# Patient Record
Sex: FEMALE | Race: WHITE | Employment: FULL TIME | URBAN - METROPOLITAN AREA
[De-identification: names, ages, dates, MRNs, and addresses within clinical notes are randomized per-mention and may not be internally consistent; named-entity substitution may affect disease eponyms.]

---

## 2017-11-03 PROBLEM — M54.16 LUMBAR RADICULOPATHY: Status: ACTIVE | Noted: 2017-11-03

## 2018-02-28 ENCOUNTER — HOSPITAL ENCOUNTER (OUTPATIENT)
Dept: ULTRASOUND IMAGING | Age: 58
Discharge: HOME | End: 2018-03-02
Payer: COMMERCIAL

## 2018-02-28 ENCOUNTER — HOSPITAL ENCOUNTER (OUTPATIENT)
Dept: GENERAL RADIOLOGY | Age: 58
Discharge: HOME | End: 2018-03-02
Payer: COMMERCIAL

## 2018-02-28 ENCOUNTER — HOSPITAL ENCOUNTER (OUTPATIENT)
Dept: WOMENS IMAGING | Age: 58
Discharge: HOME | End: 2018-03-02
Payer: COMMERCIAL

## 2018-02-28 DIAGNOSIS — M79.602 PAIN IN BOTH UPPER EXTREMITIES: ICD-10-CM

## 2018-02-28 DIAGNOSIS — Z12.39 SCREENING FOR BREAST CANCER: ICD-10-CM

## 2018-02-28 DIAGNOSIS — M79.601 PAIN IN BOTH UPPER EXTREMITIES: ICD-10-CM

## 2018-02-28 DIAGNOSIS — M79.601 PAIN OF RIGHT UPPER EXTREMITY: ICD-10-CM

## 2018-02-28 PROCEDURE — 72050 X-RAY EXAM NECK SPINE 4/5VWS: CPT

## 2018-02-28 PROCEDURE — 77067 SCR MAMMO BI INCL CAD: CPT

## 2018-02-28 PROCEDURE — 93971 EXTREMITY STUDY: CPT

## 2018-03-19 PROBLEM — R20.0 RIGHT ARM NUMBNESS: Status: ACTIVE | Noted: 2018-03-19

## 2018-03-19 PROBLEM — M54.12 CERVICAL RADICULOPATHY: Status: ACTIVE | Noted: 2018-03-19

## 2018-03-19 PROBLEM — M46.92 CERVICAL SPONDYLITIS WITH RADICULITIS (HCC): Status: ACTIVE | Noted: 2018-03-19

## 2018-03-19 PROBLEM — M54.12 CERVICAL SPONDYLITIS WITH RADICULITIS (HCC): Status: ACTIVE | Noted: 2018-03-19

## 2018-03-20 ENCOUNTER — HOSPITAL ENCOUNTER (OUTPATIENT)
Dept: MRI IMAGING | Age: 58
Discharge: HOME | End: 2018-03-22
Payer: COMMERCIAL

## 2018-03-20 DIAGNOSIS — M46.92 CERVICAL SPONDYLITIS WITH RADICULITIS (HCC): ICD-10-CM

## 2018-03-20 DIAGNOSIS — M54.12 CERVICAL SPONDYLITIS WITH RADICULITIS (HCC): ICD-10-CM

## 2018-03-20 DIAGNOSIS — R20.0 RIGHT ARM NUMBNESS: ICD-10-CM

## 2018-03-20 DIAGNOSIS — M54.12 CERVICAL RADICULOPATHY: ICD-10-CM

## 2018-03-20 PROCEDURE — 72141 MRI NECK SPINE W/O DYE: CPT

## 2018-03-22 PROBLEM — M50.20 CERVICAL DISC HERNIATION: Status: ACTIVE | Noted: 2018-03-22

## 2018-03-22 PROBLEM — M48.02 FORAMINAL STENOSIS OF CERVICAL REGION: Status: ACTIVE | Noted: 2018-03-22

## 2018-03-23 ENCOUNTER — HOSPITAL ENCOUNTER (OUTPATIENT)
Dept: PREADMISSION TESTING | Age: 58
Discharge: HOME | End: 2018-03-27
Payer: COMMERCIAL

## 2018-03-23 VITALS
HEIGHT: 64 IN | OXYGEN SATURATION: 96 % | DIASTOLIC BLOOD PRESSURE: 64 MMHG | BODY MASS INDEX: 38 KG/M2 | SYSTOLIC BLOOD PRESSURE: 132 MMHG | WEIGHT: 222.6 LBS | HEART RATE: 72 BPM | RESPIRATION RATE: 16 BRPM | TEMPERATURE: 98.5 F

## 2018-03-23 DIAGNOSIS — G90.A POTS (POSTURAL ORTHOSTATIC TACHYCARDIA SYNDROME): Chronic | ICD-10-CM

## 2018-03-23 PROBLEM — M54.12 CERVICAL SPONDYLITIS WITH RADICULITIS (HCC): Chronic | Status: ACTIVE | Noted: 2018-03-19

## 2018-03-23 PROBLEM — M48.02 FORAMINAL STENOSIS OF CERVICAL REGION: Chronic | Status: ACTIVE | Noted: 2018-03-22

## 2018-03-23 PROBLEM — M46.92 CERVICAL SPONDYLITIS WITH RADICULITIS (HCC): Chronic | Status: ACTIVE | Noted: 2018-03-19

## 2018-03-23 PROBLEM — G47.30 SLEEP APNEA: Status: ACTIVE | Noted: 2018-03-23

## 2018-03-23 PROBLEM — M50.20 CERVICAL DISC HERNIATION: Chronic | Status: ACTIVE | Noted: 2018-03-22

## 2018-03-23 PROBLEM — M54.12 CERVICAL RADICULOPATHY: Chronic | Status: ACTIVE | Noted: 2018-03-19

## 2018-03-23 PROBLEM — G43.909 MIGRAINE: Chronic | Status: ACTIVE | Noted: 2018-03-23

## 2018-03-23 PROBLEM — R20.0 RIGHT ARM NUMBNESS: Chronic | Status: ACTIVE | Noted: 2018-03-19

## 2018-03-23 LAB
ANION GAP SERPL CALCULATED.3IONS-SCNC: 14 MEQ/L (ref 7–13)
APTT: 25.3 SEC (ref 21.6–35.4)
BACTERIA: NORMAL /HPF
BILIRUBIN URINE: NEGATIVE
BLOOD, URINE: NEGATIVE
BUN BLDV-MCNC: 23 MG/DL (ref 6–20)
CALCIUM SERPL-MCNC: 9 MG/DL (ref 8.6–10.2)
CHLORIDE BLD-SCNC: 103 MEQ/L (ref 98–107)
CLARITY: CLEAR
CO2: 23 MEQ/L (ref 22–29)
COLOR: YELLOW
CREAT SERPL-MCNC: 0.96 MG/DL (ref 0.5–0.9)
EKG ATRIAL RATE: 69 BPM
EKG P AXIS: 43 DEGREES
EKG P-R INTERVAL: 156 MS
EKG Q-T INTERVAL: 394 MS
EKG QRS DURATION: 80 MS
EKG QTC CALCULATION (BAZETT): 422 MS
EKG R AXIS: 36 DEGREES
EKG T AXIS: 25 DEGREES
EKG VENTRICULAR RATE: 69 BPM
EPITHELIAL CELLS, UA: NORMAL /HPF
GFR AFRICAN AMERICAN: >60
GFR NON-AFRICAN AMERICAN: 59.5
GLUCOSE BLD-MCNC: 127 MG/DL (ref 74–109)
GLUCOSE URINE: NEGATIVE MG/DL
HCT VFR BLD CALC: 44 % (ref 37–47)
HEMOGLOBIN: 14.8 G/DL (ref 12–16)
INR BLD: 1
KETONES, URINE: NEGATIVE MG/DL
LEUKOCYTE ESTERASE, URINE: ABNORMAL
MCH RBC QN AUTO: 31 PG (ref 27–31.3)
MCHC RBC AUTO-ENTMCNC: 33.6 % (ref 33–37)
MCV RBC AUTO: 92.2 FL (ref 82–100)
NITRITE, URINE: NEGATIVE
PDW BLD-RTO: 13.5 % (ref 11.5–14.5)
PH UA: 7 (ref 5–9)
PLATELET # BLD: 218 K/UL (ref 130–400)
POTASSIUM SERPL-SCNC: 4.8 MEQ/L (ref 3.5–5.1)
PROTEIN UA: NEGATIVE MG/DL
PROTHROMBIN TIME: 10.3 SEC (ref 8.1–13.7)
RBC # BLD: 4.77 M/UL (ref 4.2–5.4)
RBC UA: NORMAL /HPF (ref 0–2)
SODIUM BLD-SCNC: 140 MEQ/L (ref 132–144)
SPECIFIC GRAVITY UA: 1.02 (ref 1–1.03)
UROBILINOGEN, URINE: 0.2 E.U./DL
WBC # BLD: 10.2 K/UL (ref 4.8–10.8)
WBC UA: NORMAL /HPF (ref 0–5)

## 2018-03-23 PROCEDURE — 85730 THROMBOPLASTIN TIME PARTIAL: CPT

## 2018-03-23 PROCEDURE — 80048 BASIC METABOLIC PNL TOTAL CA: CPT

## 2018-03-23 PROCEDURE — 85027 COMPLETE CBC AUTOMATED: CPT

## 2018-03-23 PROCEDURE — 86850 RBC ANTIBODY SCREEN: CPT

## 2018-03-23 PROCEDURE — 86901 BLOOD TYPING SEROLOGIC RH(D): CPT

## 2018-03-23 PROCEDURE — 86900 BLOOD TYPING SEROLOGIC ABO: CPT

## 2018-03-23 PROCEDURE — 81001 URINALYSIS AUTO W/SCOPE: CPT

## 2018-03-23 PROCEDURE — 93005 ELECTROCARDIOGRAM TRACING: CPT

## 2018-03-23 PROCEDURE — 85610 PROTHROMBIN TIME: CPT

## 2018-03-23 RX ORDER — DIAZEPAM 10 MG/1
10 TABLET ORAL NIGHTLY PRN
COMMUNITY

## 2018-03-23 RX ORDER — SODIUM CHLORIDE 0.9 % (FLUSH) 0.9 %
10 SYRINGE (ML) INJECTION EVERY 12 HOURS SCHEDULED
Status: CANCELLED | OUTPATIENT
Start: 2018-03-23

## 2018-03-23 RX ORDER — BUPROPION HYDROCHLORIDE 150 MG/1
150 TABLET ORAL EVERY MORNING
COMMUNITY

## 2018-03-23 RX ORDER — FAMCICLOVIR 500 MG/1
500 TABLET, FILM COATED ORAL 3 TIMES DAILY
Status: ON HOLD | COMMUNITY
End: 2018-03-26 | Stop reason: HOSPADM

## 2018-03-23 RX ORDER — SODIUM CHLORIDE, SODIUM LACTATE, POTASSIUM CHLORIDE, CALCIUM CHLORIDE 600; 310; 30; 20 MG/100ML; MG/100ML; MG/100ML; MG/100ML
INJECTION, SOLUTION INTRAVENOUS CONTINUOUS
Status: CANCELLED | OUTPATIENT
Start: 2018-03-26

## 2018-03-23 RX ORDER — IBUPROFEN 800 MG/1
800 TABLET ORAL EVERY 6 HOURS PRN
COMMUNITY

## 2018-03-23 RX ORDER — NARATRIPTAN 2.5 MG/1
2.5 TABLET ORAL
COMMUNITY
End: 2022-09-06

## 2018-03-23 RX ORDER — OMEPRAZOLE 40 MG/1
40 CAPSULE, DELAYED RELEASE ORAL DAILY
COMMUNITY

## 2018-03-23 RX ORDER — SODIUM CHLORIDE 0.9 % (FLUSH) 0.9 %
10 SYRINGE (ML) INJECTION PRN
Status: CANCELLED | OUTPATIENT
Start: 2018-03-23

## 2018-03-23 RX ORDER — LIDOCAINE HYDROCHLORIDE 10 MG/ML
1 INJECTION, SOLUTION EPIDURAL; INFILTRATION; INTRACAUDAL; PERINEURAL
Status: CANCELLED | OUTPATIENT
Start: 2018-03-23 | End: 2018-03-23

## 2018-03-23 ASSESSMENT — ENCOUNTER SYMPTOMS
GASTROINTESTINAL NEGATIVE: 1
SHORTNESS OF BREATH: 0
DOUBLE VISION: 0
EYE PAIN: 0
EYES NEGATIVE: 1
SORE THROAT: 0
BLURRED VISION: 0
COUGH: 0
CONSTIPATION: 0
NAUSEA: 0
HEARTBURN: 0
VOMITING: 0
WHEEZING: 0
BACK PAIN: 1
DIARRHEA: 0

## 2018-03-23 NOTE — H&P
shoulder area. Weakness of grasp on right hand. ). She exhibits no edema. Limited ROM right arm, shoulder, neck. Lymphadenopathy:     She has no cervical adenopathy. Neurological: She is alert and oriented to person, place, and time. No cranial nerve deficit. Reported numbness right arm, hand on right neck rotation   Skin: Skin is warm and dry. Psychiatric: Memory and affect normal.   Vitals reviewed. Assessment:  Patient Active Problem List   Diagnosis    Lumbar radiculopathy    Cervical radiculopathy    Right arm numbness    Cervical spondylitis with radiculitis (HCC)    Cervical disc herniation    Foraminal stenosis of cervical region    Migraine   POTS  Sleep apnea        Plan:  Scheduled for Right C7-T1 laminectomy, diskectomy; Right C6-7 laminectomy, foraminotomy.     Mortimer Barer, NP  3/23/2018  4:11 PM

## 2018-03-24 LAB
ABO/RH: NORMAL
ANTIBODY SCREEN: NORMAL

## 2018-03-26 ENCOUNTER — HOSPITAL ENCOUNTER (OUTPATIENT)
Dept: GENERAL RADIOLOGY | Age: 58
Setting detail: OUTPATIENT SURGERY
Discharge: HOME | End: 2018-03-28
Attending: NEUROLOGICAL SURGERY
Payer: COMMERCIAL

## 2018-03-26 ENCOUNTER — ANESTHESIA (OUTPATIENT)
Dept: OPERATING ROOM | Age: 58
End: 2018-03-26
Payer: COMMERCIAL

## 2018-03-26 ENCOUNTER — ANESTHESIA EVENT (OUTPATIENT)
Dept: OPERATING ROOM | Age: 58
End: 2018-03-26
Payer: COMMERCIAL

## 2018-03-26 ENCOUNTER — HOSPITAL ENCOUNTER (OUTPATIENT)
Age: 58
LOS: 1 days | Discharge: HOME | End: 2018-03-27
Attending: NEUROLOGICAL SURGERY | Admitting: NEUROLOGICAL SURGERY
Payer: COMMERCIAL

## 2018-03-26 VITALS — DIASTOLIC BLOOD PRESSURE: 62 MMHG | OXYGEN SATURATION: 100 % | SYSTOLIC BLOOD PRESSURE: 115 MMHG | TEMPERATURE: 97 F

## 2018-03-26 DIAGNOSIS — R52 PAIN: ICD-10-CM

## 2018-03-26 PROBLEM — M48.02 DEGENERATIVE CERVICAL SPINAL STENOSIS: Status: ACTIVE | Noted: 2018-03-26

## 2018-03-26 PROCEDURE — 72020 X-RAY EXAM OF SPINE 1 VIEW: CPT

## 2018-03-26 PROCEDURE — 7100000001 HC PACU RECOVERY - ADDTL 15 MIN: Performed by: NEUROLOGICAL SURGERY

## 2018-03-26 PROCEDURE — 6370000000 HC RX 637 (ALT 250 FOR IP): Performed by: NEUROLOGICAL SURGERY

## 2018-03-26 PROCEDURE — 6370000000 HC RX 637 (ALT 250 FOR IP): Performed by: ANESTHESIOLOGY

## 2018-03-26 PROCEDURE — 3700000001 HC ADD 15 MINUTES (ANESTHESIA): Performed by: NEUROLOGICAL SURGERY

## 2018-03-26 PROCEDURE — 2580000003 HC RX 258: Performed by: NEUROLOGICAL SURGERY

## 2018-03-26 PROCEDURE — 2580000003 HC RX 258: Performed by: NURSE ANESTHETIST, CERTIFIED REGISTERED

## 2018-03-26 PROCEDURE — 6360000002 HC RX W HCPCS: Performed by: NURSE ANESTHETIST, CERTIFIED REGISTERED

## 2018-03-26 PROCEDURE — 3600000004 HC SURGERY LEVEL 4 BASE: Performed by: NEUROLOGICAL SURGERY

## 2018-03-26 PROCEDURE — 3600000014 HC SURGERY LEVEL 4 ADDTL 15MIN: Performed by: NEUROLOGICAL SURGERY

## 2018-03-26 PROCEDURE — 7100000000 HC PACU RECOVERY - FIRST 15 MIN: Performed by: NEUROLOGICAL SURGERY

## 2018-03-26 PROCEDURE — 3700000000 HC ANESTHESIA ATTENDED CARE: Performed by: NEUROLOGICAL SURGERY

## 2018-03-26 PROCEDURE — 93010 ELECTROCARDIOGRAM REPORT: CPT | Performed by: INTERNAL MEDICINE

## 2018-03-26 PROCEDURE — 2720000010 HC SURG SUPPLY STERILE: Performed by: NEUROLOGICAL SURGERY

## 2018-03-26 PROCEDURE — 6360000002 HC RX W HCPCS: Performed by: NEUROLOGICAL SURGERY

## 2018-03-26 PROCEDURE — 2500000003 HC RX 250 WO HCPCS: Performed by: NEUROLOGICAL SURGERY

## 2018-03-26 PROCEDURE — 2500000003 HC RX 250 WO HCPCS: Performed by: NURSE ANESTHETIST, CERTIFIED REGISTERED

## 2018-03-26 PROCEDURE — 1210000000 HC MED SURG R&B

## 2018-03-26 PROCEDURE — 6360000002 HC RX W HCPCS: Performed by: ANESTHESIOLOGY

## 2018-03-26 PROCEDURE — A6402 STERILE GAUZE <= 16 SQ IN: HCPCS | Performed by: NEUROLOGICAL SURGERY

## 2018-03-26 RX ORDER — HYDROCODONE BITARTRATE AND ACETAMINOPHEN 5; 325 MG/1; MG/1
2 TABLET ORAL PRN
Status: DISCONTINUED | OUTPATIENT
Start: 2018-03-26 | End: 2018-03-26 | Stop reason: HOSPADM

## 2018-03-26 RX ORDER — ACETAMINOPHEN 650 MG/1
650 SUPPOSITORY RECTAL EVERY 4 HOURS PRN
Status: DISCONTINUED | OUTPATIENT
Start: 2018-03-26 | End: 2018-03-27 | Stop reason: HOSPADM

## 2018-03-26 RX ORDER — MEPERIDINE HYDROCHLORIDE 25 MG/ML
12.5 INJECTION INTRAMUSCULAR; INTRAVENOUS; SUBCUTANEOUS EVERY 5 MIN PRN
Status: DISCONTINUED | OUTPATIENT
Start: 2018-03-26 | End: 2018-03-26 | Stop reason: HOSPADM

## 2018-03-26 RX ORDER — LIDOCAINE HYDROCHLORIDE AND EPINEPHRINE 10; 10 MG/ML; UG/ML
INJECTION, SOLUTION INFILTRATION; PERINEURAL PRN
Status: DISCONTINUED | OUTPATIENT
Start: 2018-03-26 | End: 2018-03-26 | Stop reason: HOSPADM

## 2018-03-26 RX ORDER — BUPIVACAINE HYDROCHLORIDE 2.5 MG/ML
INJECTION, SOLUTION EPIDURAL; INFILTRATION; INTRACAUDAL PRN
Status: DISCONTINUED | OUTPATIENT
Start: 2018-03-26 | End: 2018-03-26 | Stop reason: HOSPADM

## 2018-03-26 RX ORDER — LIDOCAINE HYDROCHLORIDE 20 MG/ML
INJECTION, SOLUTION INFILTRATION; PERINEURAL PRN
Status: DISCONTINUED | OUTPATIENT
Start: 2018-03-26 | End: 2018-03-26 | Stop reason: SDUPTHER

## 2018-03-26 RX ORDER — MORPHINE SULFATE 2 MG/ML
2 INJECTION, SOLUTION INTRAMUSCULAR; INTRAVENOUS
Status: DISCONTINUED | OUTPATIENT
Start: 2018-03-26 | End: 2018-03-27 | Stop reason: HOSPADM

## 2018-03-26 RX ORDER — DIAZEPAM 5 MG/1
10 TABLET ORAL NIGHTLY PRN
Status: DISCONTINUED | OUTPATIENT
Start: 2018-03-26 | End: 2018-03-27 | Stop reason: HOSPADM

## 2018-03-26 RX ORDER — IBUPROFEN 400 MG/1
800 TABLET ORAL EVERY 6 HOURS PRN
Status: DISCONTINUED | OUTPATIENT
Start: 2018-03-26 | End: 2018-03-27 | Stop reason: HOSPADM

## 2018-03-26 RX ORDER — SODIUM CHLORIDE 450 MG/100ML
INJECTION, SOLUTION INTRAVENOUS CONTINUOUS
Status: DISCONTINUED | OUTPATIENT
Start: 2018-03-26 | End: 2018-03-27 | Stop reason: HOSPADM

## 2018-03-26 RX ORDER — ONDANSETRON 2 MG/ML
4 INJECTION INTRAMUSCULAR; INTRAVENOUS
Status: COMPLETED | OUTPATIENT
Start: 2018-03-26 | End: 2018-03-26

## 2018-03-26 RX ORDER — ROCURONIUM BROMIDE 10 MG/ML
INJECTION, SOLUTION INTRAVENOUS PRN
Status: DISCONTINUED | OUTPATIENT
Start: 2018-03-26 | End: 2018-03-26 | Stop reason: SDUPTHER

## 2018-03-26 RX ORDER — DEXAMETHASONE SODIUM PHOSPHATE 10 MG/ML
INJECTION INTRAMUSCULAR; INTRAVENOUS PRN
Status: DISCONTINUED | OUTPATIENT
Start: 2018-03-26 | End: 2018-03-26 | Stop reason: SDUPTHER

## 2018-03-26 RX ORDER — SCOLOPAMINE TRANSDERMAL SYSTEM 1 MG/1
1 PATCH, EXTENDED RELEASE TRANSDERMAL
Status: DISCONTINUED | OUTPATIENT
Start: 2018-03-26 | End: 2018-03-26

## 2018-03-26 RX ORDER — SODIUM CHLORIDE 0.9 % (FLUSH) 0.9 %
10 SYRINGE (ML) INJECTION EVERY 12 HOURS SCHEDULED
Status: DISCONTINUED | OUTPATIENT
Start: 2018-03-26 | End: 2018-03-26 | Stop reason: HOSPADM

## 2018-03-26 RX ORDER — GLYCOPYRROLATE 1 MG/5 ML
SYRINGE (ML) INTRAVENOUS PRN
Status: DISCONTINUED | OUTPATIENT
Start: 2018-03-26 | End: 2018-03-26 | Stop reason: SDUPTHER

## 2018-03-26 RX ORDER — BUPROPION HYDROCHLORIDE 150 MG/1
150 TABLET ORAL EVERY MORNING
Status: DISCONTINUED | OUTPATIENT
Start: 2018-03-27 | End: 2018-03-27 | Stop reason: HOSPADM

## 2018-03-26 RX ORDER — FENTANYL CITRATE 50 UG/ML
INJECTION, SOLUTION INTRAMUSCULAR; INTRAVENOUS PRN
Status: DISCONTINUED | OUTPATIENT
Start: 2018-03-26 | End: 2018-03-26 | Stop reason: SDUPTHER

## 2018-03-26 RX ORDER — ONDANSETRON 2 MG/ML
INJECTION INTRAMUSCULAR; INTRAVENOUS PRN
Status: DISCONTINUED | OUTPATIENT
Start: 2018-03-26 | End: 2018-03-26 | Stop reason: SDUPTHER

## 2018-03-26 RX ORDER — CYCLOBENZAPRINE HCL 10 MG
10 TABLET ORAL 3 TIMES DAILY PRN
Status: DISCONTINUED | OUTPATIENT
Start: 2018-03-26 | End: 2018-03-27 | Stop reason: HOSPADM

## 2018-03-26 RX ORDER — SODIUM CHLORIDE, SODIUM LACTATE, POTASSIUM CHLORIDE, CALCIUM CHLORIDE 600; 310; 30; 20 MG/100ML; MG/100ML; MG/100ML; MG/100ML
INJECTION, SOLUTION INTRAVENOUS CONTINUOUS
Status: DISCONTINUED | OUTPATIENT
Start: 2018-03-26 | End: 2018-03-26

## 2018-03-26 RX ORDER — ASPIRIN 81 MG/1
81 TABLET, CHEWABLE ORAL ONCE
Status: COMPLETED | OUTPATIENT
Start: 2018-03-26 | End: 2018-03-27

## 2018-03-26 RX ORDER — HYDROCODONE BITARTRATE AND ACETAMINOPHEN 5; 325 MG/1; MG/1
2 TABLET ORAL EVERY 4 HOURS PRN
Status: DISCONTINUED | OUTPATIENT
Start: 2018-03-26 | End: 2018-03-27 | Stop reason: HOSPADM

## 2018-03-26 RX ORDER — MIDAZOLAM HYDROCHLORIDE 1 MG/ML
INJECTION INTRAMUSCULAR; INTRAVENOUS PRN
Status: DISCONTINUED | OUTPATIENT
Start: 2018-03-26 | End: 2018-03-26 | Stop reason: SDUPTHER

## 2018-03-26 RX ORDER — FENTANYL CITRATE 50 UG/ML
50 INJECTION, SOLUTION INTRAMUSCULAR; INTRAVENOUS EVERY 10 MIN PRN
Status: DISCONTINUED | OUTPATIENT
Start: 2018-03-26 | End: 2018-03-26 | Stop reason: HOSPADM

## 2018-03-26 RX ORDER — ONDANSETRON 2 MG/ML
4 INJECTION INTRAMUSCULAR; INTRAVENOUS EVERY 6 HOURS PRN
Status: DISCONTINUED | OUTPATIENT
Start: 2018-03-26 | End: 2018-03-27 | Stop reason: HOSPADM

## 2018-03-26 RX ORDER — METOCLOPRAMIDE HYDROCHLORIDE 5 MG/ML
10 INJECTION INTRAMUSCULAR; INTRAVENOUS
Status: DISCONTINUED | OUTPATIENT
Start: 2018-03-26 | End: 2018-03-26 | Stop reason: HOSPADM

## 2018-03-26 RX ORDER — LIDOCAINE HYDROCHLORIDE 10 MG/ML
1 INJECTION, SOLUTION EPIDURAL; INFILTRATION; INTRACAUDAL; PERINEURAL
Status: DISCONTINUED | OUTPATIENT
Start: 2018-03-26 | End: 2018-03-26 | Stop reason: HOSPADM

## 2018-03-26 RX ORDER — KETOROLAC TROMETHAMINE 30 MG/ML
30 INJECTION, SOLUTION INTRAMUSCULAR; INTRAVENOUS EVERY 6 HOURS
Status: DISCONTINUED | OUTPATIENT
Start: 2018-03-26 | End: 2018-03-27 | Stop reason: HOSPADM

## 2018-03-26 RX ORDER — PROPOFOL 10 MG/ML
INJECTION, EMULSION INTRAVENOUS PRN
Status: DISCONTINUED | OUTPATIENT
Start: 2018-03-26 | End: 2018-03-26 | Stop reason: SDUPTHER

## 2018-03-26 RX ORDER — MAGNESIUM HYDROXIDE 1200 MG/15ML
LIQUID ORAL CONTINUOUS PRN
Status: DISCONTINUED | OUTPATIENT
Start: 2018-03-26 | End: 2018-03-26 | Stop reason: HOSPADM

## 2018-03-26 RX ORDER — GABAPENTIN 300 MG/1
300 CAPSULE ORAL
Status: DISCONTINUED | OUTPATIENT
Start: 2018-03-26 | End: 2018-03-27 | Stop reason: HOSPADM

## 2018-03-26 RX ORDER — DIPHENHYDRAMINE HYDROCHLORIDE 50 MG/ML
12.5 INJECTION INTRAMUSCULAR; INTRAVENOUS
Status: DISCONTINUED | OUTPATIENT
Start: 2018-03-26 | End: 2018-03-26 | Stop reason: HOSPADM

## 2018-03-26 RX ORDER — HYDROCODONE BITARTRATE AND ACETAMINOPHEN 5; 325 MG/1; MG/1
1 TABLET ORAL PRN
Status: DISCONTINUED | OUTPATIENT
Start: 2018-03-26 | End: 2018-03-26 | Stop reason: HOSPADM

## 2018-03-26 RX ORDER — DOCUSATE SODIUM 100 MG/1
100 CAPSULE, LIQUID FILLED ORAL 2 TIMES DAILY
Status: DISCONTINUED | OUTPATIENT
Start: 2018-03-26 | End: 2018-03-27 | Stop reason: HOSPADM

## 2018-03-26 RX ORDER — ACETAMINOPHEN 325 MG/1
650 TABLET ORAL EVERY 4 HOURS PRN
Status: DISCONTINUED | OUTPATIENT
Start: 2018-03-26 | End: 2018-03-27 | Stop reason: HOSPADM

## 2018-03-26 RX ORDER — HYDROCODONE BITARTRATE AND ACETAMINOPHEN 5; 325 MG/1; MG/1
1 TABLET ORAL EVERY 4 HOURS PRN
Status: DISCONTINUED | OUTPATIENT
Start: 2018-03-26 | End: 2018-03-27 | Stop reason: HOSPADM

## 2018-03-26 RX ORDER — FENTANYL 25 UG/H
1 PATCH TRANSDERMAL
Status: DISCONTINUED | OUTPATIENT
Start: 2018-03-26 | End: 2018-03-27 | Stop reason: HOSPADM

## 2018-03-26 RX ORDER — METOPROLOL SUCCINATE 50 MG/1
50 TABLET, EXTENDED RELEASE ORAL DAILY
Status: DISCONTINUED | OUTPATIENT
Start: 2018-03-26 | End: 2018-03-27 | Stop reason: HOSPADM

## 2018-03-26 RX ORDER — SODIUM CHLORIDE 0.9 % (FLUSH) 0.9 %
10 SYRINGE (ML) INJECTION PRN
Status: DISCONTINUED | OUTPATIENT
Start: 2018-03-26 | End: 2018-03-26 | Stop reason: HOSPADM

## 2018-03-26 RX ORDER — SODIUM CHLORIDE 0.9 % (FLUSH) 0.9 %
10 SYRINGE (ML) INJECTION PRN
Status: DISCONTINUED | OUTPATIENT
Start: 2018-03-26 | End: 2018-03-27 | Stop reason: HOSPADM

## 2018-03-26 RX ADMIN — GABAPENTIN 300 MG: 300 CAPSULE ORAL at 21:28

## 2018-03-26 RX ADMIN — SUGAMMADEX 200 MG: 100 INJECTION, SOLUTION INTRAVENOUS at 14:42

## 2018-03-26 RX ADMIN — DEXAMETHASONE SODIUM PHOSPHATE 10 MG: 10 INJECTION INTRAMUSCULAR; INTRAVENOUS at 12:31

## 2018-03-26 RX ADMIN — SODIUM CHLORIDE, POTASSIUM CHLORIDE, SODIUM LACTATE AND CALCIUM CHLORIDE: 600; 310; 30; 20 INJECTION, SOLUTION INTRAVENOUS at 11:20

## 2018-03-26 RX ADMIN — KETOROLAC TROMETHAMINE 30 MG: 30 INJECTION, SOLUTION INTRAMUSCULAR; INTRAVENOUS at 15:32

## 2018-03-26 RX ADMIN — ONDANSETRON 4 MG: 2 INJECTION INTRAMUSCULAR; INTRAVENOUS at 14:17

## 2018-03-26 RX ADMIN — ROCURONIUM BROMIDE 10 MG: 10 INJECTION INTRAVENOUS at 13:14

## 2018-03-26 RX ADMIN — HYDROMORPHONE HYDROCHLORIDE 0.5 MG: 1 INJECTION, SOLUTION INTRAMUSCULAR; INTRAVENOUS; SUBCUTANEOUS at 14:19

## 2018-03-26 RX ADMIN — VANCOMYCIN HYDROCHLORIDE 1 G: 1 INJECTION, POWDER, LYOPHILIZED, FOR SOLUTION INTRAVENOUS at 12:14

## 2018-03-26 RX ADMIN — DOCUSATE SODIUM 100 MG: 100 CAPSULE, LIQUID FILLED ORAL at 21:28

## 2018-03-26 RX ADMIN — Medication 0.2 MG: at 12:55

## 2018-03-26 RX ADMIN — LIDOCAINE HYDROCHLORIDE 80 MG: 20 INJECTION, SOLUTION INFILTRATION; PERINEURAL at 12:18

## 2018-03-26 RX ADMIN — PROPOFOL 150 MG: 10 INJECTION, EMULSION INTRAVENOUS at 12:18

## 2018-03-26 RX ADMIN — ONDANSETRON 4 MG: 2 INJECTION INTRAMUSCULAR; INTRAVENOUS at 15:29

## 2018-03-26 RX ADMIN — KETOROLAC TROMETHAMINE 30 MG: 30 INJECTION, SOLUTION INTRAMUSCULAR; INTRAVENOUS at 21:28

## 2018-03-26 RX ADMIN — FENTANYL CITRATE 100 MCG: 50 INJECTION, SOLUTION INTRAMUSCULAR; INTRAVENOUS at 12:18

## 2018-03-26 RX ADMIN — MIDAZOLAM HYDROCHLORIDE 2 MG: 1 INJECTION, SOLUTION INTRAMUSCULAR; INTRAVENOUS at 12:14

## 2018-03-26 RX ADMIN — ROCURONIUM BROMIDE 50 MG: 10 INJECTION INTRAVENOUS at 12:18

## 2018-03-26 RX ADMIN — HYDROMORPHONE HYDROCHLORIDE 0.5 MG: 1 INJECTION, SOLUTION INTRAMUSCULAR; INTRAVENOUS; SUBCUTANEOUS at 13:31

## 2018-03-26 RX ADMIN — SODIUM CHLORIDE: 4.5 INJECTION, SOLUTION INTRAVENOUS at 21:28

## 2018-03-26 RX ADMIN — PHENYLEPHRINE HYDROCHLORIDE 20 MCG/MIN: 10 INJECTION INTRAVENOUS at 13:05

## 2018-03-26 ASSESSMENT — PAIN SCALES - GENERAL
PAINLEVEL_OUTOF10: 6
PAINLEVEL_OUTOF10: 7
PAINLEVEL_OUTOF10: 0
PAINLEVEL_OUTOF10: 7
PAINLEVEL_OUTOF10: 5
PAINLEVEL_OUTOF10: 5

## 2018-03-26 ASSESSMENT — PULMONARY FUNCTION TESTS
PIF_VALUE: 17
PIF_VALUE: 19
PIF_VALUE: 16
PIF_VALUE: 15
PIF_VALUE: 19
PIF_VALUE: 16
PIF_VALUE: 17
PIF_VALUE: 16
PIF_VALUE: 16
PIF_VALUE: 3
PIF_VALUE: 19
PIF_VALUE: 17
PIF_VALUE: 17
PIF_VALUE: 15
PIF_VALUE: 19
PIF_VALUE: 17
PIF_VALUE: 15
PIF_VALUE: 17
PIF_VALUE: 19
PIF_VALUE: 15
PIF_VALUE: 15
PIF_VALUE: 17
PIF_VALUE: 15
PIF_VALUE: 16
PIF_VALUE: 17
PIF_VALUE: 16
PIF_VALUE: 17
PIF_VALUE: 16
PIF_VALUE: 16
PIF_VALUE: 15
PIF_VALUE: 17
PIF_VALUE: 18
PIF_VALUE: 17
PIF_VALUE: 18
PIF_VALUE: 16
PIF_VALUE: 17
PIF_VALUE: 16
PIF_VALUE: 3
PIF_VALUE: 27
PIF_VALUE: 16
PIF_VALUE: 19
PIF_VALUE: 15
PIF_VALUE: 16
PIF_VALUE: 12
PIF_VALUE: 16
PIF_VALUE: 16
PIF_VALUE: 19
PIF_VALUE: 18
PIF_VALUE: 17
PIF_VALUE: 15
PIF_VALUE: 17
PIF_VALUE: 0
PIF_VALUE: 15
PIF_VALUE: 16
PIF_VALUE: 19
PIF_VALUE: 17
PIF_VALUE: 16
PIF_VALUE: 15
PIF_VALUE: 19
PIF_VALUE: 20
PIF_VALUE: 15
PIF_VALUE: 19
PIF_VALUE: 17
PIF_VALUE: 23
PIF_VALUE: 17
PIF_VALUE: 17
PIF_VALUE: 19
PIF_VALUE: 19
PIF_VALUE: 4
PIF_VALUE: 17
PIF_VALUE: 16
PIF_VALUE: 2
PIF_VALUE: 17
PIF_VALUE: 26
PIF_VALUE: 17
PIF_VALUE: 14
PIF_VALUE: 17
PIF_VALUE: 16
PIF_VALUE: 4
PIF_VALUE: 3
PIF_VALUE: 17
PIF_VALUE: 15
PIF_VALUE: 18
PIF_VALUE: 16
PIF_VALUE: 19
PIF_VALUE: 17
PIF_VALUE: 19
PIF_VALUE: 17
PIF_VALUE: 19
PIF_VALUE: 15
PIF_VALUE: 17
PIF_VALUE: 16
PIF_VALUE: 17
PIF_VALUE: 16
PIF_VALUE: 17
PIF_VALUE: 16
PIF_VALUE: 17
PIF_VALUE: 18
PIF_VALUE: 15
PIF_VALUE: 18
PIF_VALUE: 1
PIF_VALUE: 16
PIF_VALUE: 19
PIF_VALUE: 15
PIF_VALUE: 17
PIF_VALUE: 18
PIF_VALUE: 19
PIF_VALUE: 17
PIF_VALUE: 16
PIF_VALUE: 17
PIF_VALUE: 15
PIF_VALUE: 16
PIF_VALUE: 16
PIF_VALUE: 17
PIF_VALUE: 19
PIF_VALUE: 16
PIF_VALUE: 16
PIF_VALUE: 17
PIF_VALUE: 16
PIF_VALUE: 15
PIF_VALUE: 19
PIF_VALUE: 17
PIF_VALUE: 17
PIF_VALUE: 19
PIF_VALUE: 15
PIF_VALUE: 21
PIF_VALUE: 15
PIF_VALUE: 17
PIF_VALUE: 15
PIF_VALUE: 17
PIF_VALUE: 5
PIF_VALUE: 14
PIF_VALUE: 22
PIF_VALUE: 19
PIF_VALUE: 16
PIF_VALUE: 17
PIF_VALUE: 17
PIF_VALUE: 16
PIF_VALUE: 19
PIF_VALUE: 16
PIF_VALUE: 17
PIF_VALUE: 19
PIF_VALUE: 16
PIF_VALUE: 15
PIF_VALUE: 16
PIF_VALUE: 19

## 2018-03-26 ASSESSMENT — PAIN DESCRIPTION - ORIENTATION
ORIENTATION: RIGHT
ORIENTATION: RIGHT

## 2018-03-26 ASSESSMENT — PAIN DESCRIPTION - PAIN TYPE
TYPE: CHRONIC PAIN
TYPE: CHRONIC PAIN

## 2018-03-26 ASSESSMENT — PAIN DESCRIPTION - LOCATION
LOCATION: ARM;SHOULDER
LOCATION: ARM;SHOULDER

## 2018-03-26 ASSESSMENT — PAIN - FUNCTIONAL ASSESSMENT: PAIN_FUNCTIONAL_ASSESSMENT: 0-10

## 2018-03-26 NOTE — ANESTHESIA PRE PROCEDURE
Answered      Vital Signs (Current):   Vitals:    03/26/18 1037   BP: 126/79   Pulse: 79   Resp: 16   Temp: 97.9 °F (36.6 °C)   TempSrc: Temporal   SpO2: 98%   Weight: 222 lb (100.7 kg)   Height: 5' 4\" (1.626 m)                                              BP Readings from Last 3 Encounters:   03/26/18 126/79   03/23/18 132/64       NPO Status: Time of last liquid consumption: 2100                        Time of last solid consumption: 1930                        Date of last liquid consumption: 03/25/18                        Date of last solid food consumption: 03/25/18    BMI:   Wt Readings from Last 3 Encounters:   03/26/18 222 lb (100.7 kg)   03/23/18 222 lb 9.6 oz (101 kg)   03/22/18 220 lb (99.8 kg)     Body mass index is 38.11 kg/m². CBC:   Lab Results   Component Value Date    WBC 10.2 03/23/2018    RBC 4.77 03/23/2018    HGB 14.8 03/23/2018    HCT 44.0 03/23/2018    MCV 92.2 03/23/2018    RDW 13.5 03/23/2018     03/23/2018       CMP:   Lab Results   Component Value Date     03/23/2018    K 4.8 03/23/2018     03/23/2018    CO2 23 03/23/2018    BUN 23 03/23/2018    CREATININE 0.96 03/23/2018    GFRAA >60.0 03/23/2018    LABGLOM 59.5 03/23/2018    GLUCOSE 127 03/23/2018    CALCIUM 9.0 03/23/2018       POC Tests: No results for input(s): POCGLU, POCNA, POCK, POCCL, POCBUN, POCHEMO, POCHCT in the last 72 hours.     Coags:   Lab Results   Component Value Date    PROTIME 10.3 03/23/2018    INR 1.0 03/23/2018    APTT 25.3 03/23/2018       HCG (If Applicable): No results found for: PREGTESTUR, PREGSERUM, HCG, HCGQUANT     ABGs: No results found for: PHART, PO2ART, AYJ9OJO, COS3BNH, BEART, Y5CIIFTP     Type & Screen (If Applicable):  No results found for: LABABO, [address]    Anesthesia Evaluation  Patient summary reviewed and Nursing notes reviewed history of anesthetic complications:   Airway: Mallampati: II  TM distance: >3 FB   Neck ROM: full  Mouth opening: > = 3 FB Dental: normal exam

## 2018-03-26 NOTE — H&P (VIEW-ONLY)
release tablet TAKE ONE AND ONE-HALF TABLETS DAILY      topiramate (TOPAMAX) 100 MG tablet Take 100 mg by mouth daily       diazepam (VALIUM) 10 MG tablet Take 10 mg by mouth nightly as needed for Sleep.  naratriptan (AMERGE) 2.5 MG tablet Take 2.5 mg by mouth once as needed for Migraine 2.5 mg at onset of headache, may repeat in 4 hours if needed      famciclovir (FAMVIR) 500 MG tablet Take 500 mg by mouth 3 times daily       No current facility-administered medications for this encounter. Allergies:  Adhesive tape; Penicillins; and Venlafaxine    Social History:   Social History     Social History    Marital status:      Spouse name: N/A    Number of children: N/A    Years of education: N/A     Occupational History    Not on file. Social History Main Topics    Smoking status: Former Smoker     Packs/day: 0.50     Types: Cigarettes     Quit date: 1992    Smokeless tobacco: Never Used    Alcohol use Yes      Comment: rarely    Drug use: No    Sexual activity: No     Other Topics Concern    Not on file     Social History Narrative    No narrative on file       Family History:       Problem Relation Age of Onset    Arthritis Mother     High Blood Pressure Mother     Heart Attack Father     Diabetes Father     Diabetes Sister     No Known Problems Daughter        Review of Systems   Constitutional: Negative. Negative for chills, fever and malaise/fatigue. HENT: Negative. Negative for congestion, ear pain, hearing loss, sore throat and tinnitus. Eyes: Negative. Negative for blurred vision, double vision and pain. Wears glasses. Respiratory: Negative for cough, shortness of breath and wheezing. Sleep apnea, on C-Pap   Cardiovascular: Positive for chest pain (due to nerve impingement from neck) and palpitations (occasional tachycardia). Negative for leg swelling. POTS, but no episodes in past 4 years. Gastrointestinal: Negative.   Negative for

## 2018-03-26 NOTE — BRIEF OP NOTE
Brief Postoperative Note  ______________________________________________________________    Patient: Viki Painter  YOB: 1960  MRN: [id number]  Date of Procedure: 3/26/2018    Pre-Op Diagnosis: HERNIATED DISC C7-T1 ON RIGHT, FORAMINAL STENOSIS C 6-7 RIGHT    Post-Op Diagnosis: Same       Procedure(s):  RIGHT C6-7,   RIGHT C7-T1, LAMINECTOMY, FORAMINOTOMY.     Anesthesia: General    Surgeon(s):  Moni Mobley MD    Staff:  First Assistant: Tawanda Machuca; Nathan Rodas  Scrub Person First: Gabrielle Chavez     Estimated Blood Loss: 58ZM    Complications: None    Specimens:   * No specimens in log *    Implants:  * No implants in log *      Drains:      Findings: hard spur at both C7-T1 AND C6-7    Gisselle Gan MD  Date: 3/26/2018  Time: 2:36 PM

## 2018-03-27 VITALS
WEIGHT: 222 LBS | TEMPERATURE: 97.7 F | BODY MASS INDEX: 37.9 KG/M2 | HEIGHT: 64 IN | OXYGEN SATURATION: 98 % | DIASTOLIC BLOOD PRESSURE: 55 MMHG | SYSTOLIC BLOOD PRESSURE: 95 MMHG | HEART RATE: 57 BPM | RESPIRATION RATE: 16 BRPM

## 2018-03-27 PROBLEM — Z98.890 S/P LAMINECTOMY: Status: ACTIVE | Noted: 2018-03-27

## 2018-03-27 PROCEDURE — 6370000000 HC RX 637 (ALT 250 FOR IP): Performed by: NEUROLOGICAL SURGERY

## 2018-03-27 PROCEDURE — 6360000002 HC RX W HCPCS: Performed by: NEUROLOGICAL SURGERY

## 2018-03-27 RX ORDER — TOPIRAMATE 100 MG/1
100 TABLET, FILM COATED ORAL DAILY
Status: DISCONTINUED | OUTPATIENT
Start: 2018-03-27 | End: 2018-03-27 | Stop reason: HOSPADM

## 2018-03-27 RX ORDER — SODIUM CHLORIDE 0.9 % (FLUSH) 0.9 %
10 SYRINGE (ML) INJECTION EVERY 12 HOURS SCHEDULED
Status: DISCONTINUED | OUTPATIENT
Start: 2018-03-27 | End: 2018-03-27 | Stop reason: HOSPADM

## 2018-03-27 RX ADMIN — Medication 5000 UNITS: at 08:55

## 2018-03-27 RX ADMIN — ASPIRIN 81 MG 81 MG: 81 TABLET ORAL at 08:55

## 2018-03-27 RX ADMIN — BUPROPION HYDROCHLORIDE 150 MG: 150 TABLET, FILM COATED, EXTENDED RELEASE ORAL at 08:57

## 2018-03-27 RX ADMIN — KETOROLAC TROMETHAMINE 30 MG: 30 INJECTION, SOLUTION INTRAMUSCULAR; INTRAVENOUS at 03:31

## 2018-03-27 RX ADMIN — TOPIRAMATE 100 MG: 100 TABLET, FILM COATED ORAL at 12:03

## 2018-03-27 RX ADMIN — DOCUSATE SODIUM 100 MG: 100 CAPSULE, LIQUID FILLED ORAL at 08:54

## 2018-03-27 RX ADMIN — KETOROLAC TROMETHAMINE 30 MG: 30 INJECTION, SOLUTION INTRAMUSCULAR; INTRAVENOUS at 08:57

## 2018-03-27 RX ADMIN — METOPROLOL SUCCINATE 50 MG: 50 TABLET, FILM COATED, EXTENDED RELEASE ORAL at 09:23

## 2018-03-27 ASSESSMENT — PAIN SCALES - GENERAL
PAINLEVEL_OUTOF10: 5
PAINLEVEL_OUTOF10: 5

## 2018-03-27 NOTE — PROGRESS NOTES
Patient discharged home with sister; discharge instructions reviewed with patient and sister-verbalized understanding and signed; Fentanyl patch, scopolamine patch, shower, DSD off Thurs-patient and sister aware;   1200- Dr. Casie moore and office called back; notified of need for Gabapentin order called into [address] at [address] [facility]. Patient does not want to wait to verify if order went through and will follow up with office if not called in.   I will call patient if notified of order called to pharmacy  Electronically signed by Janie Travis RN on 3/27/2018 at 2:23 PM

## 2018-06-13 DIAGNOSIS — R20.0 RIGHT ARM NUMBNESS: Chronic | ICD-10-CM

## 2018-06-13 DIAGNOSIS — M54.12 CERVICAL RADICULOPATHY: Primary | Chronic | ICD-10-CM

## 2018-06-15 ENCOUNTER — HOSPITAL ENCOUNTER (OUTPATIENT)
Dept: MRI IMAGING | Age: 58
Discharge: HOME | End: 2018-06-17
Payer: COMMERCIAL

## 2018-06-15 DIAGNOSIS — M54.12 CERVICAL RADICULOPATHY: Chronic | ICD-10-CM

## 2018-06-15 DIAGNOSIS — R20.0 RIGHT ARM NUMBNESS: Chronic | ICD-10-CM

## 2018-06-15 PROCEDURE — 72156 MRI NECK SPINE W/O & W/DYE: CPT

## 2018-06-15 PROCEDURE — 6360000004 HC RX CONTRAST MEDICATION: Performed by: NEUROLOGICAL SURGERY

## 2018-06-15 PROCEDURE — A9579 GAD-BASE MR CONTRAST NOS,1ML: HCPCS | Performed by: NEUROLOGICAL SURGERY

## 2018-06-15 RX ORDER — 0.9 % SODIUM CHLORIDE 0.9 %
10 VIAL (ML) INJECTION ONCE
Status: DISCONTINUED | OUTPATIENT
Start: 2018-06-15 | End: 2018-06-18 | Stop reason: HOSPADM

## 2018-06-15 RX ADMIN — GADOTERIDOL 15 ML: 279.3 INJECTION, SOLUTION INTRAVENOUS at 07:51

## 2018-11-19 DIAGNOSIS — M54.12 CERVICAL RADICULOPATHY: Chronic | ICD-10-CM

## 2018-11-19 DIAGNOSIS — R20.0 RIGHT ARM NUMBNESS: Chronic | ICD-10-CM

## 2018-11-19 LAB
ALBUMIN SERPL-MCNC: 4.3 G/DL (ref 3.9–4.9)
ALP BLD-CCNC: 125 U/L (ref 40–130)
ALT SERPL-CCNC: <5 U/L (ref 0–33)
ANION GAP SERPL CALCULATED.3IONS-SCNC: 11 MEQ/L (ref 7–13)
AST SERPL-CCNC: <5 U/L (ref 0–35)
BILIRUB SERPL-MCNC: 0.5 MG/DL (ref 0–1.2)
BUN BLDV-MCNC: 15 MG/DL (ref 6–20)
CALCIUM SERPL-MCNC: 10.1 MG/DL (ref 8.6–10.2)
CHLORIDE BLD-SCNC: 108 MEQ/L (ref 98–107)
CO2: 25 MEQ/L (ref 22–29)
CREAT SERPL-MCNC: 0.99 MG/DL (ref 0.5–0.9)
GFR AFRICAN AMERICAN: >60
GFR NON-AFRICAN AMERICAN: 57.3
GLOBULIN: 3.1 G/DL (ref 2.3–3.5)
GLUCOSE BLD-MCNC: 94 MG/DL (ref 74–109)
POTASSIUM SERPL-SCNC: 5 MEQ/L (ref 3.5–5.1)
SODIUM BLD-SCNC: 144 MEQ/L (ref 132–144)
TOTAL PROTEIN: 7.4 G/DL (ref 6.4–8.1)

## 2019-02-28 PROBLEM — M25.512 PAIN IN JOINT OF LEFT SHOULDER: Status: ACTIVE | Noted: 2019-02-28

## 2019-05-24 ENCOUNTER — HOSPITAL ENCOUNTER (OUTPATIENT)
Dept: MRI IMAGING | Age: 59
Discharge: HOME | End: 2019-05-26
Payer: COMMERCIAL

## 2019-05-24 DIAGNOSIS — M75.42 IMPINGEMENT SYNDROME OF LEFT SHOULDER: ICD-10-CM

## 2019-05-24 PROCEDURE — 73221 MRI JOINT UPR EXTREM W/O DYE: CPT

## 2019-06-14 ENCOUNTER — HOSPITAL ENCOUNTER (OUTPATIENT)
Dept: WOMENS IMAGING | Age: 59
Discharge: HOME | End: 2019-06-16
Payer: COMMERCIAL

## 2019-06-14 DIAGNOSIS — Z12.31 ENCOUNTER FOR SCREENING MAMMOGRAM FOR BREAST CANCER: ICD-10-CM

## 2019-06-14 PROCEDURE — 77067 SCR MAMMO BI INCL CAD: CPT

## 2020-09-16 ENCOUNTER — HOSPITAL ENCOUNTER (OUTPATIENT)
Dept: WOMENS IMAGING | Age: 60
Discharge: HOME | End: 2020-09-18
Payer: COMMERCIAL

## 2020-09-16 PROCEDURE — 77063 BREAST TOMOSYNTHESIS BI: CPT

## 2021-10-25 ENCOUNTER — HOSPITAL ENCOUNTER (OUTPATIENT)
Dept: WOMENS IMAGING | Age: 61
Discharge: HOME | End: 2021-10-27
Payer: COMMERCIAL

## 2021-10-25 VITALS — HEIGHT: 64 IN | BODY MASS INDEX: 38.28 KG/M2

## 2021-10-25 DIAGNOSIS — Z12.31 ENCOUNTER FOR SCREENING MAMMOGRAM FOR BREAST CANCER: ICD-10-CM

## 2021-10-25 DIAGNOSIS — Z12.31 SCREENING MAMMOGRAM FOR HIGH-RISK PATIENT: ICD-10-CM

## 2021-10-25 PROCEDURE — 77063 BREAST TOMOSYNTHESIS BI: CPT

## 2021-12-29 ENCOUNTER — HOSPITAL ENCOUNTER (OUTPATIENT)
Dept: ULTRASOUND IMAGING | Age: 61
Discharge: HOME | End: 2021-12-31
Payer: COMMERCIAL

## 2021-12-29 DIAGNOSIS — R10.2 PELVIC PAIN IN FEMALE: ICD-10-CM

## 2021-12-29 PROCEDURE — 76830 TRANSVAGINAL US NON-OB: CPT

## 2021-12-29 PROCEDURE — 76856 US EXAM PELVIC COMPLETE: CPT

## 2022-06-15 ENCOUNTER — HOSPITAL ENCOUNTER (OUTPATIENT)
Dept: PHYSICAL THERAPY | Age: 62
Setting detail: THERAPIES SERIES
Discharge: HOME | End: 2022-06-15
Payer: COMMERCIAL

## 2022-06-15 PROCEDURE — 97110 THERAPEUTIC EXERCISES: CPT

## 2022-06-15 PROCEDURE — 97162 PT EVAL MOD COMPLEX 30 MIN: CPT

## 2022-06-15 ASSESSMENT — PAIN DESCRIPTION - ONSET: ONSET: AWAKENED FROM SLEEP

## 2022-06-15 ASSESSMENT — PAIN DESCRIPTION - ORIENTATION: ORIENTATION: RIGHT

## 2022-06-15 ASSESSMENT — PAIN DESCRIPTION - LOCATION: LOCATION: NECK

## 2022-06-15 ASSESSMENT — PAIN DESCRIPTION - FREQUENCY: FREQUENCY: CONTINUOUS

## 2022-06-15 ASSESSMENT — PAIN DESCRIPTION - DESCRIPTORS: DESCRIPTORS: ACHING;TINGLING

## 2022-06-15 ASSESSMENT — PAIN SCALES - GENERAL: PAINLEVEL_OUTOF10: 7

## 2022-06-15 ASSESSMENT — PAIN - FUNCTIONAL ASSESSMENT: PAIN_FUNCTIONAL_ASSESSMENT: PREVENTS OR INTERFERES WITH ALL ACTIVE AND SOME PASSIVE ACTIVITIES

## 2022-06-15 NOTE — PROGRESS NOTES
tablet, Rfl: 3    predniSONE (DELTASONE) 10 MG tablet, 4 tabs for 3 days, then 3 tabs for 3 days, then 2 tabs for 3 days, then 1 tab for 3 days, then DC., Disp: 30 tablet, Rfl: 0    predniSONE (DELTASONE) 10 MG tablet, Take 1 tablet by mouth daily 1 tab TID x 3 days, 1 tab BID x 3 days, 1 tab QD x 3 days, Disp: 18 tablet, Rfl: 0    predniSONE (DELTASONE) 10 MG tablet, Take 1 tablet by mouth daily 1 tab TID x 3 days, 1 tab BID x 3 days, 1 tab QD x 3 days, Disp: 18 tablet, Rfl: 0    gabapentin (NEURONTIN) 300 MG capsule, Take 1 capsule by mouth 2 times daily for 30 days. ., Disp: 60 capsule, Rfl: 1    predniSONE (DELTASONE) 10 MG tablet, Take 1 tablet by mouth daily 1 tab TID x 3 days, 1 tab BID x 3 days, 1 tab QD x 3 days, Disp: 18 tablet, Rfl: 0    buPROPion (WELLBUTRIN XL) 150 MG extended release tablet, Take 150 mg by mouth every morning, Disp: , Rfl:     omeprazole (PRILOSEC) 40 MG delayed release capsule, Take 40 mg by mouth daily, Disp: , Rfl:     diazepam (VALIUM) 10 MG tablet, Take 10 mg by mouth nightly as needed for Sleep., Disp: , Rfl:     ibuprofen (ADVIL;MOTRIN) 800 MG tablet, Take 800 mg by mouth every 6 hours as needed for Pain, Disp: , Rfl:     Cholecalciferol (VITAMIN D3) 5000 units TABS, Take 5,000 Units by mouth daily, Disp: , Rfl:     naratriptan (AMERGE) 2.5 MG tablet, Take 2.5 mg by mouth once as needed for Migraine 2.5 mg at onset of headache, may repeat in 4 hours if needed, Disp: , Rfl:     PREDNISONE PO, Take by mouth daily 4 days 40mg, 4 days 30mg, 4 days 20mg, 4 days 10mg,, Disp: , Rfl:     PREDNISONE, STANLEY, PO, Take 30 mg by mouth Started at 40 mg with taper to 10 mg., Disp: , Rfl:     aspirin 81 MG tablet, Take 81 mg by mouth, Disp: , Rfl:     metoprolol succinate (TOPROL XL) 50 MG extended release tablet, TAKE ONE AND ONE-HALF TABLETS DAILY, Disp: , Rfl:   Allergies: Adhesive tape, Penicillins, and Venlafaxine      SUBJECTIVE EXAMINATION     History obtained from[de-identified] Patient,Chart Review,      Family/Caregiver Present: No    Subjective History: Onset Date: 03/01/22  Subjective: Pt reports pain since March 2022 with radicular symptoms down R UE into all fingers. Pt has appt with pain mgmt starting in July. Pt would like the back to be evaluated and will get referral to add to POC. Pt reports will be having nNeck surgery pending MRI results  Additional Pertinent Hx (if applicable): 4891 and 8656 laminectomy/fusion with metal implants. Migraines, cardiac cath   Prior diagnostic testing[de-identified]  (MRI scheduled next week)  Previous treatments prior to current episode?: Injections,Outpatient PT,Medications,Surgery (uses tylenol for pain control)  Any changes to allergies, medications, or have you had any medical procedures/ER visits since your last visit?: Yes (PCN, lexapro, tape redness)        Pain Screening    Pain Screening  Patient Currently in Pain: Yes  Pain Assessment: 0-10  Pain Level: 7  Best Pain Level: 5  Worst Pain Level: 10  Pain Location: Neck  Pain Orientation: Right  Pain Radiating Towards: Base of neck and radiates to R UE with pain and NT  Pain Descriptors: Aching,Tingling  Pain Frequency: Continuous  Pain Onset: Awakened from sleep  Functional Pain Assessment: Prevents or interferes with all active and some passive activities  Aggravating factors: Lifting,Laying on involved side,Sitting  Pain Management/Relieving Factors: Ice,Medications (icy hot and valium)    Functional Status    Social History:    Social History  Lives With: Spouse  Home Layout: One level  Home Access: Stairs to enter with rails  Entrance Stairs - Number of Steps: 3 recip  Bathroom Shower/Tub: Tub/Shower unit    Occupation/Interests:   Occupation: Full time employment  Job Duties: Sedentary,Prolonged sitting  Leisure & Hobbies: stationary bike, walking >30 min,    Prior Level of Function:   75% 3/2022          Current Level of Function:   25% current function, walking can be limited due to pain. ADL Assistance: Independent  Homemaking Assistance: Independent  Homemaking Responsibilities: Yes  Ambulation Assistance: Independent  Transfer Assistance: Independent  Active : Yes  Mode of Transportation: Car      OBJECTIVE EXAMINATION     Review of Systems:  Vision: Within Functional Limits  Hearing: Within functional limits    VBI Screening / Lumbar Screening:   Oral/facial numbness: Yes (R cheek intermittent)  Dizziness: No  Double Vision: No  Blacking Out: No  Difficulty talking/word choice: Yes (intermittent in the past)  Difficulty swallowing: Yes  Nausea/vomiting: No  Tinnitus: No  Generalized Weakness: Yes (R UE)     Observations:   General Observations  Cervical: Forward head posture,Right Lateral flexion/head tilt (slight)  Description: rounded shoulder    Palpation:   Cervical Spine Palpation: B cervical paraspinal tenderness, min tightness hanna around C 7 prominence  Right Shoulder Palpation: UT region      Balance Screen:   Balance  Posture: Good  Sitting - Static: Good  Sitting - Dynamic: Good  Standing - Static: Good  Standing - Dynamic: Good  Comments: No falls    Neuro Screen: Sensation  Light Touch: Partial deficits in the RUE  Upper Quarter Deep Tendon Reflex (DTR)  Right Biceps (C5-6):  Diminished  Right Brachioradialis (C5-6):  Diminished  Right Triceps (C7):  Diminished  Left  Biceps (C5-6):  Diminished  Left Brachioradialis (C5-6):  Diminished  Left Triceps (C7):  Diminished      Left Strength  Right Strength         L UE Strength Comment: 4/5 througout shoulder, elbow and wrist,  65#  L UE Strength Comment: 4/5 througout shoulder, elbow and wrist,  65#    R UE Strength Comment: 4-/5 throughout shoulder, elbow and wrist,  60#  R UE Strength Comment: 4-/5 throughout shoulder, elbow and wrist,  60#     Cervical Assessment     AROM Cervical Spine   Cervical spine general AROM: Flex 25, Ext 20, 20L SB, 25 R SB with pain all directions           Special Tests:   Special Good,Fair    Factors which may impact rehabilitation potential include: Medical co-morbidities     Patient Education: Ashly Barclay of Care,Evaluative findings,Insurance,Home Exercise Program      GOALS   Patient Goal(s): Patient goals : Decrease pain    Short Term Goals Completed by 2 weeks Goal Status   The pt will demonstrate improved postural awareness requiring <25% VC's with exercises New   Decrease Cervical  pain 50% to assist with improved functional gains. New       Long Term Goals Completed by 4-6 weeks Goal Status   Indep HEP for symptom management New   Pt demo improved overall function by reporting greater than 75% per functional survey score New   Pain-free cervical AROM to >/=35 deg all planes allowing an increase in ADL tolerance. New   Improve R UE strength 4/5 to allow patient to reach overhead and lift carry with less difficulty New          TREATMENT PLAN       Requires PT Follow-Up: Yes    Treatment may include any combination of the following: Strengthening,ROM,Modalities,Integrated dry needling,Home exercise program,Manual Therapy - Soft Tissue Mobilization     Frequency / Duration:  Patient to be seen 1-2 times per week for 4-6 weeks weeks  Plan Comment:    Pt request decrease frequency due to financial implications of therapy cost.          Eval Complexity:   Decision Making: Medium Complexity  History: Personal Factors and/or Comorbidities Impacting POC: Medium  History: 2018 and 2019 laminectomy/fusion with metal implants. Migraines, cardiac cath  Examination of body system(s) including body structures and functions, activity limitations, and/or participation restrictions: Medium  Exam: NDI 18/50=64% functional  Clinical Presentation: Medium    POST-PAIN     Pain Rating (0-10 pain scale):   6/10  Location and pain description same as pre-treatment unless indicated.    Action: [x] NA  [] Call Physician  [] Perform HEP  [] Meds as prescribed    Evaluation and patient rights have been reviewed and patient agrees with plan of care. Yes  [x]  No  []   Explain:     [place] Fall Risk Assessment  Risk Factor Scale  Score   History of Falls [] Yes  [x] No 25  0 0   Secondary Diagnosis [] Yes  [x] No 15  0 0   Ambulatory Aid [] Furniture  [] Crutches/cane/walker  [x] None/bedrest/wheelchair/nurse 30  15  0 0   IV/Heparin Lock [] Yes  [x] No 20  0 0   Gait/Transferring [] Impaired  [] Weak  [x] Normal/bedrest/immobile 20  10  0 0   Mental Status [] Forgets limitations  [x] Oriented to own ability 15  0 0      Total:0     Based on the Assessment score: check the appropriate box.   [x]  No intervention needed   Low =   Score of 0-24  []  Use standard prevention interventions Moderate =  Score of 24-44   [] Discuss fall prevention strategies   [] Indicate moderate falls risk on eval  []  Use high risk prevention interventions High = Score of 45 and higher   [] Discuss fall prevention strategies   [] Provide supervision during treatment time      Minutes:  PT Individual Minutes  Time In: 1010  Time Out: 1046  Minutes: 36  Timed Code Treatment Minutes: 8 Minutes  Procedure Minutes:28 min Eval     Timed Activity Minutes Units   Ther Ex 8 1     Electronically signed by Yari Terry PT on 6/15/22 at 2:26 PM EDT

## 2022-06-15 NOTE — PROGRESS NOTES
[address]  [address]:[phone]    [] Certification  [] Recertification [x]  Plan of Care  [] Progress Note [] Discharge      Referring Provider: WALTER Hughes      From:  Adri Mohan, PT   Patient: Alirio Gonzalez (35 y.o. female) : 1960 Date: 06/15/2022   Medical Diagnosis: Radiculopathy, cervical region [M54.12]  Arthrodesis status [Z98.1]    Treatment Diagnosis: Cervical pain with R>L strength deficits with decrease ROM and  strength       Progress Report Period from:  6/15/2022  to 6/15/2022    Visits to Date: 1 No Show: 0 Cancelled Appts: 0    OBJECTIVE:   Short Term Goals - Time Frame for Short term goals: 2 weeks    Goals Current/Discharge status  Status   Short term goal 1: The pt will demonstrate improved postural awareness requiring <25% VC's with exercises  General Observations  Cervical: Forward head posture,Right Lateral flexion/head tilt (slight)  Description: rounded shoulder  STG Goal 1 Status[de-identified] New   Short term goal 2: Decrease Cervical  pain 50% to assist with improved functional gains.   Pain Screening  Patient Currently in Pain: Yes  Pain Assessment: 0-10  Pain Level: 7  Best Pain Level: 5  Worst Pain Level: 10  Pain Location: Neck  Pain Orientation: Right  Pain Radiating Towards: Base of neck and radiates to R UE with pain and NT  Pain Descriptors: Aching,Tingling  Pain Frequency: Continuous  Pain Onset: Awakened from sleep  Functional Pain Assessment: Prevents or interferes with all active and some passive activities  Aggravating factors: Lifting,Laying on involved side,Sitting  Pain Management/Relieving Factors: Ice,Medications (icy hot and valium) STG Goal 2 Status[de-identified] New     Long Term Goals - Time Frame for Long term goals : 4-6 weeks  Goals Current/ Discharge status Met   Long term goal 1: Indep HEP for symptom management Written HEP initiated  for symptom management  Needs progression for comprehensive program development. LTG Goal 1 Status[de-identified] New   Long term goal 2: Pt demo improved overall function by reporting greater than 75% per functional survey score Exam: NDI 18/50=64% functional   LTG Goal 2 Status[de-identified] New   Long term goal 3: Pain-free cervical AROM to >/=35 deg all planes allowing an increase in ADL tolerance. AROM Cervical Spine   Cervical spine general AROM: Flex 25, Ext 20, 20L SB, 25 R SB with pain all directions    LTG Goal 3 Status[de-identified] New   Long term goal 4: Improve R UE strength 4/5 to allow patient to reach overhead and lift carry with less difficulty R UE Strength Comment: 4-/5 throughout shoulder, elbow and wrist,  60# LTG Goal 4 Status[de-identified] New       Body Structures, Functions, Activity Limitations Requiring Skilled Therapeutic Intervention: Decreased ROM,Decreased strength,Decreased posture,Decreased sensation,Increased pain  Assessment: The pt's impairments currently limit functional abilities by 36% including her abilities to  reach and lift, perform recreational activities, and perform household/work related duties without pain or limitations. Skilled PT required to address about deficits to improve over function and return to prior level of function. Therapy Prognosis: Good,Fair    Special Test:          Special Tests for Cervical Spine  Special Tests: compression(no change), distraction(decrease pain), spurlings(-),  sharp-chidi(decreases pain), Modified DeKlyn's(-), Adsons(-)        PT Education: Goals;PT Role;Plan of Care;Evaluative findings; Insurance;Home Exercise Program    PLAN: [x] Evaluate and Treat  Frequency/Duration:  Plan Frequency: 1-2  Plan weeks: 4-6 weeks  Current Treatment Recommendations: Strengthening,ROM,Modalities,Integrated dry needling,Home exercise program,Manual Therapy - Soft Tissue Mobilization  Plan Comment: Pt request decrease frequency due to financial implications of therapy cost.                       Patient Status:[x] Continue/ Initiate plan of Care     [] Discharge PT. Recommend pt continue with HEP. [] Additional visits requested, Please re-certify for additional visits:        Signature: Electronically signed by Agustin Severance, PT on 6/15/22 at 2:23 PM EDT      If you have any questions or concerns, please don't hesitate to call. Thank you for your referral.    I have reviewed this plan of care and certify a need for medically necessary rehabilitation services.     Physician Signature:__________________________________________________________  Date:  Please sign and return

## 2022-06-23 ENCOUNTER — TELEPHONE (OUTPATIENT)
Dept: PAIN MANAGEMENT | Age: 62
End: 2022-06-23

## 2022-06-23 ENCOUNTER — APPOINTMENT (OUTPATIENT)
Dept: PHYSICAL THERAPY | Age: 62
End: 2022-06-23
Payer: COMMERCIAL

## 2022-06-23 ENCOUNTER — OFFICE VISIT (OUTPATIENT)
Dept: PAIN MANAGEMENT | Age: 62
End: 2022-06-23
Payer: COMMERCIAL

## 2022-06-23 VITALS
HEIGHT: 63 IN | SYSTOLIC BLOOD PRESSURE: 122 MMHG | WEIGHT: 226 LBS | BODY MASS INDEX: 40.04 KG/M2 | TEMPERATURE: 97.1 F | DIASTOLIC BLOOD PRESSURE: 74 MMHG

## 2022-06-23 DIAGNOSIS — M54.12 CERVICAL RADICULOPATHY: ICD-10-CM

## 2022-06-23 DIAGNOSIS — M50.20 PROTRUSION OF CERVICAL INTERVERTEBRAL DISC: Primary | ICD-10-CM

## 2022-06-23 PROBLEM — K21.9 GASTRO-ESOPHAGEAL REFLUX DISEASE WITHOUT ESOPHAGITIS: Status: ACTIVE | Noted: 2019-10-29

## 2022-06-23 PROBLEM — E78.2 MIXED HYPERLIPIDEMIA: Status: ACTIVE | Noted: 2022-06-23

## 2022-06-23 PROBLEM — F32.9 MAJOR DEPRESSIVE DISORDER, SINGLE EPISODE, UNSPECIFIED: Status: ACTIVE | Noted: 2022-06-23

## 2022-06-23 PROCEDURE — 99214 OFFICE O/P EST MOD 30 MIN: CPT | Performed by: NURSE PRACTITIONER

## 2022-06-23 NOTE — PROGRESS NOTES
Kelley Case  (1960)    2022    Subjective:     Kelley Case is 60 y.o. female who complains today of:    Chief Complaint   Patient presents with    Neck Pain    Back Pain         Allergies:  Adhesive tape, Penicillins, and Venlafaxine    Past Medical History:   Diagnosis Date    Cervical disc herniation 3/22/2018    Cervical radiculopathy 3/19/2018    Cervical spondylitis with radiculitis ([address] 3/19/2018    Foraminal stenosis of cervical region 3/22/2018    Migraine aura without headache     dizziness, neck pain    Mixed hyperlipidemia 2022    PONV (postoperative nausea and vomiting)     patient needs anti-emetics to prevent triggering migraine    POTS (postural orthostatic tachycardia syndrome)     no symptoms in last 4 years    Right arm numbness 3/19/2018    Sleep apnea 3/23/2018    On C-Pap     Past Surgical History:   Procedure Laterality Date    CARDIAC CATHETERIZATION      ENDOMETRIAL ABLATION      NJ LAMINECTOMY,>2 SGMT,CERVICAL N/A 3/26/2018    RIGHT C6-7,   RIGHT C7-T1, LAMINECTOMY, FORAMINOTOMY. performed by Brandi Rodriguez MD at [address] History   Problem Relation Age of Onset    Arthritis Mother     High Blood Pressure Mother     Heart Attack Father     Diabetes Father     Diabetes Sister     No Known Problems Daughter     Breast Cancer Paternal Grandmother      Social History     Socioeconomic History    Marital status:       Spouse name: Not on file    Number of children: Not on file    Years of education: Not on file    Highest education level: Not on file   Occupational History    Not on file   Tobacco Use    Smoking status: Former Smoker     Packs/day: 0.50     Years: 15.00     Pack years: 7.50     Types: Cigarettes     Quit date: 12     Years since quittin.4    Smokeless tobacco: Never Used   Substance and Sexual Activity    Alcohol use: Yes     Comment: rarely    Drug use: No    Sexual activity: Never   Other Topics Concern    Not on file   Social History Narrative    Not on file     Social Determinants of Health     Financial Resource Strain:     Difficulty of Paying Living Expenses: Not on file   Food Insecurity:     Worried About Running Out of Food in the Last Year: Not on file    Jamarcus of Food in the Last Year: Not on file   Transportation Needs:     Lack of Transportation (Medical): Not on file    Lack of Transportation (Non-Medical):  Not on file   Physical Activity:     Days of Exercise per Week: Not on file    Minutes of Exercise per Session: Not on file   Stress:     Feeling of Stress : Not on file   Social Connections:     Frequency of Communication with Friends and Family: Not on file    Frequency of Social Gatherings with Friends and Family: Not on file    Attends Jehovah's witness Services: Not on file    Active Member of [address] [place] [address] or Organizations: Not on file    Attends Club or Organization Meetings: Not on file    Marital Status: Not on file   Intimate Partner Violence:     Fear of Current or Ex-Partner: Not on file    Emotionally Abused: Not on file    Physically Abused: Not on file    Sexually Abused: Not on file   Housing Stability:     Unable to Pay for Housing in the Last Year: Not on file    Number of [place] in the Last Year: Not on file    Unstable Housing in the Last Year: Not on file       Current Outpatient Medications on File Prior to Visit   Medication Sig Dispense Refill    meloxicam (MOBIC) 15 MG tablet Take 1 tablet by mouth daily 30 tablet 3    predniSONE (DELTASONE) 10 MG tablet 4 tabs for 3 days, then 3 tabs for 3 days, then 2 tabs for 3 days, then 1 tab for 3 days, then DC. 30 tablet 0    predniSONE (DELTASONE) 10 MG tablet Take 1 tablet by mouth daily 1 tab TID x 3 days, 1 tab BID x 3 days, 1 tab QD x 3 days 18 tablet 0    predniSONE (DELTASONE) 10 MG tablet Take 1 tablet by mouth daily 1 tab TID x 3 days, 1 tab BID x 3 days, 1 tab QD x 3 days 18 tablet 0    gabapentin (NEURONTIN) 300 MG capsule Take 1 capsule by mouth 2 times daily for 30 days. . 60 capsule 1    predniSONE (DELTASONE) 10 MG tablet Take 1 tablet by mouth daily 1 tab TID x 3 days, 1 tab BID x 3 days, 1 tab QD x 3 days 18 tablet 0    buPROPion (WELLBUTRIN XL) 150 MG extended release tablet Take 150 mg by mouth every morning      omeprazole (PRILOSEC) 40 MG delayed release capsule Take 40 mg by mouth daily      diazepam (VALIUM) 10 MG tablet Take 10 mg by mouth nightly as needed for Sleep.  ibuprofen (ADVIL;MOTRIN) 800 MG tablet Take 800 mg by mouth every 6 hours as needed for Pain      Cholecalciferol (VITAMIN D3) 5000 units TABS Take 5,000 Units by mouth daily      naratriptan (AMERGE) 2.5 MG tablet Take 2.5 mg by mouth once as needed for Migraine 2.5 mg at onset of headache, may repeat in 4 hours if needed      PREDNISONE PO Take by mouth daily 4 days 40mg, 4 days 30mg, 4 days 20mg, 4 days 10mg,      PREDNISONE, STANLEY, PO Take 30 mg by mouth Started at 40 mg with taper to 10 mg.      aspirin 81 MG tablet Take 81 mg by mouth      metoprolol succinate (TOPROL XL) 50 MG extended release tablet TAKE ONE AND ONE-HALF TABLETS DAILY       No current facility-administered medications on file prior to visit. Pt presents today for a f/u of her pain. PCP is Dr. Marcos Rizvi. Patient was last seen in 2019 by this NP and Dr. Prakash Chung. She had  ACDF C5-6, 6-7 on 10/28/19 with Dr Prakash Chung. Hx of migraines, ELENA and CPAP, C6-7, C7-T1 foraminotomy on the right side 2018 and uterine ablation 2012. She says she is here today for similar pain that radiates into Rt arm to hand. She says these Sx started about 6 months. Has seen Dr. Martín Cole. XR's and MRI done. She did start PT at [facility] for her neck as well. Just had cervical MRI on 6/21/22 report showing C5-7 fusion, C3-4 bulging disc, C4-5 central disc protrusion and C7-T1 disc protrusion towards Lt per report.   Says Dr. Itzel Lord office discussed possible ernesto injection. She is on baby ASA Hx of cardiac cath in 2011. Surg Hx of bladder sling and cervical fusion. Uterine ablation  PMH: GERD, ulcer, migraine. Cannot use NSAIDS. Gets diazepam for her migraines she says. She evidently has low back pain as well. XR report of low back from 5/23/22 shows significant facet arthropathy L4-5 and XR report of neck from same date shows diffuse degenerative changes and mild motion at C4-5, fusion intact. Pt feels pain level 5/10 for neck today and 7/10 for low back. Pt feels that using Rt arm, working at computer makes the pain worse, and tylenol, valium makes the pain better. Pt admits to Rt UE radiating numbness and tingling. Denies recent falls, injuries or trauma. Pt denies new weakness. Pt reports PT has been started. Review of Systems      Objective:     Vitals:  /74   Temp 97.1 °F (36.2 °C)   Ht 5' 3\" (1.6 m)   Wt 226 lb (102.5 kg)   LMP  (LMP Unknown)   BMI 40.03 kg/m² Pain Score:   6 (cervical 6 and lumbar  8-9)      Physical Exam  This is a pleasant female who answers questions appropriately and follows commands. Pt is alert and oriented x 3. Recent and remote memory is intact. Mood and affect, judgement and insight are normal.  No signs of distress, no dyspnea or SOB noted. HEENT: PERRL. Neck is supple, trachea midline. No lymphadenopathy noted. Decreased ROM with flexion, extension and rotation of cervical spine. Tightness in trapezius with palpation noted. Not too tender with palpation over cervical spine. Positive Spurling's maneuver. Positive Carolyn's sign. Strong grasp B/L. Strength is functional in UE bilaterally. Pulses are intact. Surgical scar noted neck. Decreased ROM with flexion and extension of low back. Mildly tender with palpation to lumbar spine with palpation. Negative SLR. Tightness in both hamstrings noted. Balance and coordination normal.  Strength is functional for ambulation.  Cranial nerves II-XII are intact. Assessment:      Diagnosis Orders   1. Protrusion of cervical intervertebral disc  WI NJX DX/THER SBST INTRLMNR CRV/THRC W/IMG GDN   2. Cervical radiculopathy  WI NJX DX/THER SBST INTRLMNR CRV/THRC W/IMG GDN       Plan:          No orders of the defined types were placed in this encounter. Orders Placed This Encounter   Procedures    WI NJX DX/THER SBST INTRLMNR CRV/THRC W/IMG GDN     Lt C7-T1 interlam. Cervical ernesto with SM - hold ASA x 7     Standing Status:   Future     Standing Expiration Date:   9/21/2022     Discussed options with the patient today. Anatomic model pathology was shown and reviewed with pt. Will order C7-T1 Lt interlaminar ernesto with Dr. Mauricio Conway d/t C7-T1 disc protrusion per MRI report. She will complete PT. She will f/u with Dr. Cami Go. Reviewed XR of neck and low back reports and MRI of neck in detail. May need injections in low back in the future. All questions were answered. Discussed home exercise program.  Relevant imaging and pain generators reviewed. Pt verbalized understanding and agrees with above plan. Pt has chronic pain. OARRS was reviewed. This NP saw pt under direct supervision of Dr. Mauricio Conway. Follow up:  Return for procedure with Dr. Mauricio Conway.     Radha Wright, CHUCKY - CNP

## 2022-06-23 NOTE — TELEPHONE ENCOUNTER
BENEFITS:    Insurance: YON  Phone: [phone]  Contact Name: Trevin Baltazar.   Effective Date: 1/1/22     Plan year: MICHAEL  Deductible: 3,500      Deductible Met: YES  Allowed/benefits paid at: 100% AFTER $75 COPAY  OOP: $6,850.00, $3,736.46 MET  Freq Limits: MED NEC  Prior Auth Requirement: NONE    Notes:     Call Reference #: [id number]    Time of call: [phone]

## 2022-06-23 NOTE — TELEPHONE ENCOUNTER
ORDER PLACED:    Date: 6/23/22  Description: LEFT C7-T1 ILESI   Order Number: [id number]  Ordering Provider: Mignon Terrell  Performing Provider: Chacho Mojica  CPT Codes: 64904  ICD10 Codes: M54.12

## 2022-06-29 ENCOUNTER — HOSPITAL ENCOUNTER (OUTPATIENT)
Dept: PHYSICAL THERAPY | Age: 62
Setting detail: THERAPIES SERIES
Discharge: HOME | End: 2022-06-29
Payer: COMMERCIAL

## 2022-06-29 PROCEDURE — 97110 THERAPEUTIC EXERCISES: CPT

## 2022-06-29 PROCEDURE — 97162 PT EVAL MOD COMPLEX 30 MIN: CPT

## 2022-06-29 ASSESSMENT — PAIN DESCRIPTION - FREQUENCY: FREQUENCY: CONTINUOUS

## 2022-06-29 ASSESSMENT — PAIN DESCRIPTION - ORIENTATION: ORIENTATION: LOWER;LEFT

## 2022-06-29 ASSESSMENT — PAIN SCALES - GENERAL: PAINLEVEL_OUTOF10: 6

## 2022-06-29 ASSESSMENT — PAIN DESCRIPTION - LOCATION: LOCATION: BACK

## 2022-06-29 ASSESSMENT — PAIN DESCRIPTION - DESCRIPTORS: DESCRIPTORS: SHARP

## 2022-06-29 ASSESSMENT — PAIN - FUNCTIONAL ASSESSMENT: PAIN_FUNCTIONAL_ASSESSMENT: PREVENTS OR INTERFERES WITH ALL ACTIVE AND SOME PASSIVE ACTIVITIES

## 2022-06-29 NOTE — PROGRESS NOTES
Medications:     meloxicam (MOBIC) 15 MG tablet, Take 1 tablet by mouth daily, Disp: 30 tablet, Rfl: 3    predniSONE (DELTASONE) 10 MG tablet, 4 tabs for 3 days, then 3 tabs for 3 days, then 2 tabs for 3 days, then 1 tab for 3 days, then DC., Disp: 30 tablet, Rfl: 0    predniSONE (DELTASONE) 10 MG tablet, Take 1 tablet by mouth daily 1 tab TID x 3 days, 1 tab BID x 3 days, 1 tab QD x 3 days, Disp: 18 tablet, Rfl: 0    predniSONE (DELTASONE) 10 MG tablet, Take 1 tablet by mouth daily 1 tab TID x 3 days, 1 tab BID x 3 days, 1 tab QD x 3 days, Disp: 18 tablet, Rfl: 0    gabapentin (NEURONTIN) 300 MG capsule, Take 1 capsule by mouth 2 times daily for 30 days. ., Disp: 60 capsule, Rfl: 1    predniSONE (DELTASONE) 10 MG tablet, Take 1 tablet by mouth daily 1 tab TID x 3 days, 1 tab BID x 3 days, 1 tab QD x 3 days, Disp: 18 tablet, Rfl: 0    buPROPion (WELLBUTRIN XL) 150 MG extended release tablet, Take 150 mg by mouth every morning, Disp: , Rfl:     omeprazole (PRILOSEC) 40 MG delayed release capsule, Take 40 mg by mouth daily, Disp: , Rfl:     diazepam (VALIUM) 10 MG tablet, Take 10 mg by mouth nightly as needed for Sleep., Disp: , Rfl:     ibuprofen (ADVIL;MOTRIN) 800 MG tablet, Take 800 mg by mouth every 6 hours as needed for Pain, Disp: , Rfl:     Cholecalciferol (VITAMIN D3) 5000 units TABS, Take 5,000 Units by mouth daily, Disp: , Rfl:     naratriptan (AMERGE) 2.5 MG tablet, Take 2.5 mg by mouth once as needed for Migraine 2.5 mg at onset of headache, may repeat in 4 hours if needed, Disp: , Rfl:     PREDNISONE PO, Take by mouth daily 4 days 40mg, 4 days 30mg, 4 days 20mg, 4 days 10mg,, Disp: , Rfl:     PREDNISONE, STANLEY, PO, Take 30 mg by mouth Started at 40 mg with taper to 10 mg., Disp: , Rfl:     aspirin 81 MG tablet, Take 81 mg by mouth, Disp: , Rfl:     metoprolol succinate (TOPROL XL) 50 MG extended release tablet, TAKE ONE AND ONE-HALF TABLETS DAILY, Disp: , Rfl:   Allergies: Adhesive tape, Penicillins, and Venlafaxine      SUBJECTIVE EXAMINATION     History obtained from[de-identified] Patient,Chart Review,      Family/Caregiver Present: No    Subjective History: Onset Date: 12/30/21 (back pain started to become constant)  Subjective: Pt presents for lumbar evaluation with new script to add to current POC for cervical POC. Pt reports no recent injections for back, saw pain mgmt and plans to have injection for the cervical spine when approved. Additional Pertinent Hx (if applicable): 3762 and 9965 laminectomy/fusion with metal implants. Migraines, cardiac cath   Prior diagnostic testing[de-identified]  (MRI bulging disc with prorusion)  Previous treatments prior to current episode?: Outpatient PT,Medications (uses tylenol for pain control)  Any changes to allergies, medications, or have you had any medical procedures/ER visits since your last visit?: Yes (PCN, lexapro, tape redness)        Pain Screening    Pain Screening  Patient Currently in Pain: Yes  Pain Level: 6  Best Pain Level: 2  Worst Pain Level: 9  Pain Location: Back  Pain Orientation: Lower,Left  Pain Radiating Towards: anterior thigh with numbness and tingling temporary last 1/2 day of more at times  Pain Descriptors: Sharp (constant)  Pain Frequency: Continuous  Functional Pain Assessment: Prevents or interferes with all active and some passive activities  Aggravating factors: Walking,Standing,Sitting,Carrying,Lifting (bending)  Pain Management/Relieving Factors: Ice,Heat    Functional Status    Social History:    Social History  Lives With: Spouse  Home Layout: One level  Home Access: Stairs to enter with rails  Entrance Stairs - Number of Steps: 3 recip  Bathroom Shower/Tub: Tub/Shower unit    Occupation/Interests:   Occupation: Full time employment  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 General   Sleeping Tolerance: limited 5-6 hours  Bed Mobility: Indep  Picking Up Light Object: slow guarded  Driving:  WNL with lumbar pillow  Recreational Activities: walking limited, ex bike limited    ADL Assistance: [address]: [address] [place] [address] Responsibilities: Yes  Ambulation Assistance: Independent  Transfer Assistance: Independent  Active : Yes  Mode of Transportation: Car       OBJECTIVE EXAMINATION     Review of Systems:  Vision: Within Functional Limits  Hearing: Within functional limits    VBI Screening / Lumbar Screening:    Bowel/bladder disturbances: No  Saddle anesthesia: No  Unexplained weight loss: No  Severe motor weakness: No  Stumbling or giving way while walking: No  Unrelenting pain at night: Yes (couple occassions)    Regional Screen:   Hip Screen: Progressive Care  Knee Screen: Progressive Care  Ankle Screen: Progressive Care     Observations:   General Observations  Spine / Pelvis: Increased lumbar lordosis,Thoracic Kyphosis  Description: rounded shoulder, mild kyphosis    Palpation:   Lumbar Spine Palpation: upper lumbar and glut tightness moderate to max    Mobility:       Ambulation  Surface: carpet  Device: No Device  Assistance: Independent  Gait Deviations: None  Stairs/Curb  Stairs?: No      Balance Screen:   Balance  Posture: Good  Sitting - Static: Good  Sitting - Dynamic: Good  Standing - Static: Good  Standing - Dynamic: Good  Comments: No falls    Neuro Screen: Sensation  Light Touch: Partial deficits in the RUE,Partial deficits in the RLE (decreased to light touch R later anterior tibialis)    Left AROM  Right AROM         AROM LLE (degrees)  L Hip Flexion 0-125: 60    AROM RLE (degrees)  R Hip Flexion 0-125: 60            Left Strength  Right Strength         Strength LLE  L Hip Flexion: 4-/5  L Hip Extension: 3+/5,4-/5  L Hip ABduction: 4+/5  L Hip Internal Rotation: 4-/5,3+/5  L Hip External Rotation: 4-/5,3+/5  L Knee Flexion: 4/5  L Knee Extension: 4/5    Strength RLE  R Hip Flexion: 4-/5  R Hip Extension: 4-/5  R Hip ABduction: 4/5  R Hip Internal Rotation: 4-/5  R Hip External Rotation: 4-/5  R Knee Flexion: 4/5  R Knee Extension: 4/5     Lumbar Assessment     AROM Lumbar Spine   Lumbar spine general AROM: Flex 50%, Ext 25%, SB 25% with pain        Trunk Strength     Trunk Strength  Lower abdominals: Poor (2 sec hold with arms crossed)     Muscle Length/Flexibility:   Muscle Length LE  90/90 SLR (Hamstring Tightness): Hamstring flexibility -30°R, -25°L at 90/90 hip/knee position. Special Tests:   Special Tests Lumbar Spine  JUAN Test: R (+),L (+)  Prone Knee Bend Test: R (-),L (-)  SLR: R (-),L (-)  Slump Test: R (+),L (-)  Other:  (decompression lumbar increases pain)      Outcomes Score:  Exam: Mod Oswestry 20/50=60% functional      Treatment:    Exercises:   Exercises  Exercise 1: UT stretch 10s x 10 vc to decrease pressure  Exercise 2: chin tuck 5 s x 10*  Exercise 3: levator stretch 10s x 5 no overpressure  Exercise 4: scap retractions 5 s x 10 vc to keep UT relaxed. Treatment Reasoning  Limitations addressed: Posture,Flexibility,Strength  Functional ability(s) targeted: Reaching overhead     Manual:  Manual Therapy  Joint Mobilization: trial C5 rotation mob with R UE abd*  Soft Tissue Mobilizaton: B UT* and B lumbar and superior glut*  Other: KT tape after patch test due to sensistivity to tape*  Treatment Reasoning  Limitations addressed: Joint motion,Tissue extensibility  *Indicates exercise,modality, or manual techniques to be initiated when appropriate       ASSESSMENT     Impression: Assessment: This is a chronic problem. The current episode started more than 1 year ago. The problem occurs daily. The problem has been gradually worsening since onset. The pain is present in the lumbar spine. The pain does radiate to L ant thigh. The pain is moderate. The pain is worse with bending, walking and prolonged positions.  Pertinent negatives include no bladder or bowel incontinence. Takes tylenol for the symptoms, scheduled for injections upon approval.    Body Structures, Functions, Activity Limitations Requiring Skilled Therapeutic Intervention: Decreased ROM,Decreased strength,Decreased posture,Decreased sensation,Increased pain    Statement of Medical Necessity: Physical Therapy is both indicated and medically necessary as outlined in the POC to increase the likelihood of meeting the functionally related goals stated below. Patient's Activity Tolerance: No sig increase in pain with evaluation reported  Patient's rehabilitation potential/prognosis is considered to be: Good,Fair    Factors which may impact rehabilitation potential include: Medical co-morbidities     Patient Education: Alfredo Painter of Care,Evaluative findings,Insurance,Home Exercise Program      GOALS   Patient Goal(s): Patient goals : Decrease pain    Short Term Goals Completed by 2 weeks Goal Status   The pt will demonstrate improved postural awareness requiring <25% VC's with exercises New   Decrease Cervical and lumbar pain 50% to assist with improved functional gains. New       Long Term Goals Completed by 4-6 weeks Goal Status   Indep HEP for symptom management New   Pt demo improved overall function by reporting greater than 75% per NDI and Mod Oswestry functional survey score New   Pain-free cervical AROM to >/=35 deg all planes allowing an increase in ADL tolerance. New   Improve R UE and B LE strength 4/5 to allow patient to reach overhead and lift carry with less difficulty New   Pain-free Lumbar AROM to 75% WNL allowing an increase in ADL tolerance.  New          TREATMENT PLAN       Requires PT Follow-Up: Yes    Treatment may include any combination of the following: Strengthening,ROM,Modalities,Integrated dry needling,Home exercise program,Manual Therapy - Soft Tissue Mobilization     Frequency / Duration:  Patient to be seen 1-2 times per week for 4-6 weeks weeks  Plan Comment: verbal cues to improve cervical stretches for HEP compliance  Pt request decrease frequency due to financial implications of therapy cost.          Eval Complexity:   Decision Making: Medium Complexity  History: Personal Factors and/or Comorbidities Impacting POC: Medium  History: 2018 and 2019 laminectomy/fusion. Migraines and cardiac cath  Examination of body system(s) including body structures and functions, activity limitations, and/or participation restrictions: Medium  Exam: Mod Oswestry 20/50=60% functional  Clinical Presentation: Medium    POST-PAIN     Pain Rating (0-10 pain scale):  5 /10 back, 4/10 neck pain  Location and pain description same as pre-treatment unless indicated. Action: [x] NA  [] Call Physician  [] Perform HEP  [] Meds as prescribed    Evaluation and patient rights have been reviewed and patient agrees with plan of care. Yes  [x]  No  []   Explain:     No Fall Risk Assessment  Risk Factor Scale  Score   History of Falls [] Yes  [x] No 25  0 0   Secondary Diagnosis [] Yes  [x] No 15  0 0   Ambulatory Aid [] Furniture  [] Crutches/cane/walker  [x] None/bedrest/wheelchair/nurse 30  15  0 0   IV/Heparin Lock [] Yes  [x] No 20  0 0   Gait/Transferring [] Impaired  [] Weak  [x] Normal/bedrest/immobile 20  10  0 0   Mental Status [] Forgets limitations  [x] Oriented to own ability 15  0 0      Total:0     Based on the Assessment score: check the appropriate box.   [x]  No intervention needed   Low =   Score of 0-24  []  Use standard prevention interventions Moderate =  Score of 24-44   [] Discuss fall prevention strategies   [] Indicate moderate falls risk on eval  []  Use high risk prevention interventions High = Score of 45 and higher   [] Discuss fall prevention strategies   [] Provide supervision during treatment time      Minutes:  PT Individual Minutes  Time In: 0900  Time Out: 0952  Minutes: 52  Timed Code Treatment Minutes: 8 Minutes  Procedure Minutes:40 min Eval   Timed Activity Minutes Units   Ther Ex 8 1     Electronically signed by Alida Gallegos PT on 6/29/22 at 12:09 PM EDT

## 2022-06-29 NOTE — TELEPHONE ENCOUNTER
Received response letter from Dr. Sierra David stating that \"Patient has not seen us since 2010 and has no pending appts. Will need to re-establish for clearance\". May need to call patient to clarify who is her blood thinner prescribing physician.

## 2022-06-29 NOTE — TELEPHONE ENCOUNTER
Pt stated she is taking the baby asa and does not need to see anyone for the blood thinner she is aware to stop a week prior to injection and does not need to see the dr she is taking the baby asa on her own it was recommended for her to take that is why she is taking this

## 2022-06-29 NOTE — PROGRESS NOTES
[address]  [address]      WAXJX:[phone]    [] Certification  [] Recertification [x]  Plan of Care  [] Progress Note [] Discharge      Referring Provider: WALTER Roman      From:  Rhonda Howard, PT   Patient: Ana Brasher (68 y.o. female) : 1960 Date: 2022   Medical Diagnosis: Radiculopathy, cervical region [M54.12]  Arthrodesis status [Z98.1]  Low back pain, unspecified [M54.50]    Treatment Diagnosis: Lumbar and Cervical pain with R>L UE and B LE strength deficits with decrease ROM and decreased  strength       Progress Report Period from:  2022  to 2022    Visits to Date: 2 No Show: 0 Cancelled Appts: 0    OBJECTIVE:   Short Term Goals - Time Frame for Short term goals: 2 weeks    Goals Current/Discharge status  Status   Short term goal 1: The pt will demonstrate improved postural awareness requiring <25% VC's with exercises  General Observations  Spine / Pelvis: Increased lumbar lordosis,Thoracic Kyphosis  Description: rounded shoulder, mild kyphosis  STG Goal 1 Status[de-identified] New   Short term goal 2: Decrease Cervical and lumbar pain 50% to assist with improved functional gains.   Pain Screening  Patient Currently in Pain: Yes  Pain Level: 6  Best Pain Level: 2  Worst Pain Level: 9  Pain Location: Back  Pain Orientation: Lower,Left  Pain Radiating Towards: anterior thigh with numbness and tingling temporary last 1/2 day of more at times  Pain Descriptors: Sharp (constant)  Pain Frequency: Continuous  Functional Pain Assessment: Prevents or interferes with all active and some passive activities  Aggravating factors: Walking,Standing,Sitting,Carrying,Lifting (bending)  Pain Management/Relieving Factors: Ice,Heat STG Goal 2 Status[de-identified] New     Long Term Goals - Time Frame for Long term goals : 4-6 weeks  Goals Current/ Discharge status Met   Long term goal 1: Indep HEP for symptom management Written HEP initiated  for symptom management  Needs progression for comprehensive program development. LTG Goal 1 Status[de-identified] New   Long term goal 2: Pt demo improved overall function by reporting greater than 75% per NDI and Mod Oswestry functional survey score Exam: Mod Oswestry 20/50=60% functional   LTG Goal 2 Status[de-identified] New   Long term goal 3: Pain-free cervical AROM to >/=35 deg all planes allowing an increase in ADL tolerance. NT LTG Goal 3 Status[de-identified] New   Long term goal 4: Improve R UE and B LE strength 4/5 to allow patient to reach overhead and lift carry with less difficulty  Strength LLE  L Hip Flexion: 4-/5  L Hip Extension: 3+/5,4-/5  L Hip ABduction: 4+/5  L Hip Internal Rotation: 4-/5,3+/5  L Hip External Rotation: 4-/5,3+/5  L Knee Flexion: 4/5  L Knee Extension: 4/5    Strength RLE  R Hip Flexion: 4-/5  R Hip Extension: 4-/5  R Hip ABduction: 4/5  R Hip Internal Rotation: 4-/5  R Hip External Rotation: 4-/5  R Knee Flexion: 4/5  R Knee Extension: 4/5   LTG Goal 4 Status[de-identified] New   Long term goal 5: Pain-free Lumbar AROM to 75% WNL allowing an increase in ADL tolerance. AROM Lumbar Spine   Lumbar spine general AROM: Flex 50%, Ext 25%, SB 25% with pain       LTG Goal 5 Status[de-identified] New       Body Structures, Functions, Activity Limitations Requiring Skilled Therapeutic Intervention: Decreased ROM,Decreased strength,Decreased posture,Decreased sensation,Increased pain  Assessment: This is a chronic problem. The current episode started more than 1 year ago. The problem occurs daily. The problem has been gradually worsening since onset. The pain is present in the lumbar spine. The pain does radiate to L ant thigh. The pain is moderate. The pain is worse with bending, walking and prolonged positions. Pertinent negatives include no bladder or bowel incontinence.  Takes tylenol for the symptoms, scheduled for injections upon approval.  Therapy Prognosis: Good,Fair    Special Test:         Special Tests Lumbar Spine  JUAN Test: R (+),L (+)  Prone Knee Bend Test: R (-),L (-)  SLR: R (-),L (-)  Slump Test: R (+),L (-)  Other:  (decompression lumbar increases pain)            PT Education: Goals;PT Role;Plan of Care;Evaluative findings; Insurance;Home Exercise Program  Patient Education: verbal cues to improve cervical stretches for HEP compliance    PLAN: [x] Evaluate and Treat  Frequency/Duration:  Plan Frequency: 1-2  Plan weeks: 4-6 weeks  Current Treatment Recommendations: Strengthening,ROM,Modalities,Integrated dry needling,Home exercise program,Manual Therapy - Soft Tissue Mobilization  Plan Comment: Pt request decrease frequency due to financial implications of therapy cost.                  Patient Status:[x] Continue/ Initiate plan of Care     [] Discharge PT. Recommend pt continue with HEP. [] Additional visits requested, Please re-certify for additional visits:        Signature: Electronically signed by Denton Wray PT on 6/29/22 at 10:59 AM EDT      If you have any questions or concerns, please don't hesitate to call. Thank you for your referral.    I have reviewed this plan of care and certify a need for medically necessary rehabilitation services.     Physician Signature:__________________________________________________________  Date:  Please sign and return

## 2022-06-30 ENCOUNTER — APPOINTMENT (OUTPATIENT)
Dept: PHYSICAL THERAPY | Age: 62
End: 2022-06-30
Payer: COMMERCIAL

## 2022-07-06 ENCOUNTER — HOSPITAL ENCOUNTER (OUTPATIENT)
Dept: PHYSICAL THERAPY | Age: 62
Setting detail: THERAPIES SERIES
Discharge: HOME | End: 2022-07-06
Payer: COMMERCIAL

## 2022-07-06 PROCEDURE — 97110 THERAPEUTIC EXERCISES: CPT

## 2022-07-06 PROCEDURE — G0283 ELEC STIM OTHER THAN WOUND: HCPCS

## 2022-07-06 ASSESSMENT — PAIN DESCRIPTION - FREQUENCY: FREQUENCY: CONTINUOUS

## 2022-07-06 ASSESSMENT — PAIN SCALES - GENERAL: PAINLEVEL_OUTOF10: 7

## 2022-07-06 ASSESSMENT — PAIN DESCRIPTION - LOCATION: LOCATION: BACK;NECK

## 2022-07-06 ASSESSMENT — PAIN DESCRIPTION - ORIENTATION: ORIENTATION: LOWER

## 2022-07-06 NOTE — PROGRESS NOTES
[address] [place] [address]  [address]  ROIWO:[phone]  Treatment Note        Date: 2022  Patient: Katalina Pereyra  : 1960   Confirmed: Yes  MRN: [id number]  Referring Provider: WALTER Maria   Medical Diagnosis: Radiculopathy, cervical region [M54.12]  Arthrodesis status [Z98.1]    Treatment Diagnosis: Lumbar and Cervical pain with R>L UE and B LE strength deficits with decrease ROM and decreased  strength    Visit Information:  Insurance: Payor: [employer] / Plan: [employer] 150 - [place] PPO / Product Type: *No Product type* /   PT Visit Information  Onset Date: 21  Total # of Visits to Date: 3  No Show: 0  Canceled Appointment: 0  Progress Note Counter: 3/12    Subjective Information:  Subjective: Her low back is always bad. Her right arm has been hurting since  from the exercises and all her fingers in her right hand is numb and tingling since this morning. She isn't sure which exercises is aggravating it.   HEP Compliance:  [x] Good [] Fair [] Poor [] Reports not doing due to:    Pain Screening  Patient Currently in Pain: Yes  Pain Assessment: 0-10  Pain Level: 7 (7/10 LB, 5-6/10 neck)  Pain Location: Back,Neck  Pain Orientation: Lower  Pain Descriptors:  (constant)  Pain Frequency: Continuous    Treatment:  Exercises:  Exercises  Exercise 5: supine c-nod 10 x 5\" , c-nod with scap squeeze 10 x 5\"  Exercise 6: thoracic mob with half foam roll and B shl flex 10 x 5\"  Exercise 7: thoracic mob with pivot pec stretch 10 x 5\"  Exercise 8: supine chest pull with GTB 10 x 3\"  Exercise 9: LTR 10 x 5\"  Exercise 10: SKTC  Exercise 11: TA iso 10 x 5\"  ,  TA march 10 x 3\"  Exercise 12: HEP given on 22: thoracic mobs, LTR, SKTC, TA iso, TA march  Treatment Reasoning  Limitations addressed: Posture,Flexibility,Strength  Functional ability(s) targeted: Reaching overhead    Manual:   Manual Therapy  Joint Mobilization: trial C5 rotation mob with R UE abd*  Soft Tissue Mobilizaton: B UT in seated x 7 min  Other: KT tape after patch test due to sensistivity to tape*      Modalities:  Electric stimulation, unattended (CPT 02203) /  (Medicare)  Patient Position: Seated  E-stim location: Right,Left,Low back  E-stim type: Interferential (IFC)  Post treatment skin assessment: Intact  Untimed (minutes): 10    *Indicates exercise, modality, or manual techniques to be initiated when appropriate      Strength: [x] NT  [] MMT completed:    ROM: [x] NT  [] ROM measurements:      Assessment: Body Structures, Functions, Activity Limitations Requiring Skilled Therapeutic Intervention: Decreased ROM,Decreased strength,Decreased posture,Decreased sensation,Increased pain  Assessment: Initiated cervical and lumbar therex this date. Instructed in deep cervical flexor training and thoracic mobs to improve posture. Performed gentle stretches and and deep core stab ex's. Pt took instructions well with good tolerance to ex's. Reported relief with STM and E-stim. Issued written HEP with good understanding. Treatment Diagnosis: Lumbar and Cervical pain with R>L UE and B LE strength deficits with decrease ROM and decreased  strength  Therapy Prognosis: Good,Fair    Post-Pain Assessment:       Pain Rating (0-10 pain scale):   \"better\"/10   Location and pain description same as pre-treatment unless indicated. Action: [] NA   [x] Perform HEP  [x] Meds as prescribed  [x] Modalities as prescribed   [] Call Physician     GOALS   Patient Goal(s): Patient goals : Decrease pain    Short Term Goals Completed by 2 weeks Goal Status   STG 1 The pt will demonstrate improved postural awareness requiring <25% VC's with exercises In progress   STG 2 Decrease Cervical and lumbar pain 50% to assist with improved functional gains.  In progress       Long Term Goals Completed by 4-6 weeks Goal Status   LTG 1 Indep HEP for symptom management In progress   LTG 2 Pt demo improved overall function by reporting greater than 75% per NDI and Mod Oswestry functional survey score In progress   LTG 3 Pain-free cervical AROM to >/=35 deg all planes allowing an increase in ADL tolerance. In progress   LTG 4 Improve R UE and B LE strength 4/5 to allow patient to reach overhead and lift carry with less difficulty In progress   LTG 5 Pain-free Lumbar AROM to 75% WNL allowing an increase in ADL tolerance. In progress     Plan:  Frequency/Duration:  Plan  Plan Frequency: 1-2  Plan weeks: 4-6 weeks  Current Treatment Recommendations: Strengthening,ROM,Modalities,Integrated dry needling,Home exercise program,Manual Therapy - Soft Tissue Mobilization  Plan Comment: Pt request decrease frequency due to financial implications of therapy cost.  Pt to continue current HEP. See objective section for any therapeutic exercise changes, additions or modifications this date.     Therapy Time:   PT Individual Minutes  Time In: 8942  Time Out: 2869  Minutes: 57  Timed Code Treatment Minutes: 45 Minutes  Procedure Minutes: E-stim x 10'   Timed Activity Minutes Units   Ther Ex 38 3   Manual  7 0     Electronically signed by Bryson Herman PTA on 7/6/22 at 10:06 AM EDT

## 2022-07-15 ENCOUNTER — HOSPITAL ENCOUNTER (OUTPATIENT)
Dept: PHYSICAL THERAPY | Age: 62
Setting detail: THERAPIES SERIES
Discharge: HOME | End: 2022-07-15
Payer: COMMERCIAL

## 2022-07-15 PROCEDURE — 97110 THERAPEUTIC EXERCISES: CPT

## 2022-07-15 PROCEDURE — G0283 ELEC STIM OTHER THAN WOUND: HCPCS

## 2022-07-15 ASSESSMENT — PAIN DESCRIPTION - ORIENTATION: ORIENTATION: LOWER

## 2022-07-15 ASSESSMENT — PAIN DESCRIPTION - LOCATION: LOCATION: BACK;NECK

## 2022-07-15 ASSESSMENT — PAIN SCALES - GENERAL: PAINLEVEL_OUTOF10: 7

## 2022-07-15 ASSESSMENT — PAIN DESCRIPTION - FREQUENCY: FREQUENCY: CONTINUOUS

## 2022-07-15 NOTE — PROGRESS NOTES
[address] [place] [address]  [address][phone]  Treatment Note        Date: 7/15/2022  Patient: Lawanda Bence  : 1960   Confirmed: Yes  MRN: [id number]  Referring Provider: WALTER Nunez   Medical Diagnosis: Radiculopathy, cervical region [M54.12]  Arthrodesis status [Z98.1]    Treatment Diagnosis: Lumbar and Cervical pain with R>L UE and B LE strength deficits with decrease ROM and decreased  strength    Visit Information:  Insurance: Payor: [employer] / Plan: [employer] - [place] PPO / Product Type: *No Product type* /   PT Visit Information  Onset Date: 21  Total # of Visits to Date: 4  No Show: 0  Canceled Appointment: 0  Progress Note Counter:     Subjective Information:  Subjective: Pt states that she had good pain relief after the E-stim treatment to her low back last visit. The relief lasted for 2-3 days. She bought a TENS unit off [employer] but hasn't received it yet. Has mild pain in her neck this morning. Still has the tingling/numbness radiating down her right arm into her fingers as well as in her left fingers.   HEP Compliance:  [x] Good [] Fair [] Poor [] Reports not doing due to:    Pain Screening  Patient Currently in Pain: Yes  Pain Assessment: 0-10  Pain Level: 7 (3/10 neck, 7/10 LB)  Pain Location: Back, Neck  Pain Orientation: Lower  Pain Frequency: Continuous    Treatment:  Exercises:  Exercises  Exercise 1: cervical stretches - HEP  Exercise 2: supine c-nod 10 x 5\" , c-nod with scap squeeze 10 x 5\"  Exercise 3: thoracic mob with half foam roll and B shl flex 10 x 5\"  Exercise 4: thoracic mob with pivot pec stretch 10 x 5\"  Exercise 5: supine chest pull with GTB 10 x 3\"  Exercise 6: LTR 10 x 5\"  Exercise 7: SKTC 5 x 20\"  Exercise 8: HS stretch with strap 5 x 20\"  Exercise 9: TA iso 10 x 5\"  ,  TA march 10 x 3\"  ,  TA SLR 10 x 3\"  Exercise 10: Bridge 10 x 3\"  Exercise 12: HEP given on 22: thoracic mobs, LTR, SKTC, TA iso, TA march  Treatment Reasoning  Limitations addressed: Posture, Flexibility, Strength  Functional ability(s) targeted: Reaching overhead    Modalities:  Electric stimulation, unattended (CPT 22 520011) /  (Medicare)  Patient Position: Seated  E-stim location: Right, Left, Low back  E-stim type: Interferential (IFC)  Post treatment skin assessment: Intact  Untimed (minutes): 10      *Indicates exercise, modality, or manual techniques to be initiated when appropriate    Strength: [x] NT  [] MMT completed:      ROM: [x] NT  [] ROM measurements:    Assessment: Body Structures, Functions, Activity Limitations Requiring Skilled Therapeutic Intervention: Decreased ROM, Decreased strength, Decreased posture, Decreased sensation, Increased pain  Assessment: Reviewed C/LB therex from initial visit. Progressed DLS with added TA SLR and bridges. Also added a HS stretch. Pt tolerated increased intensity well. Reported decreased LBP post TE and post E-stim. Slight increased tingling in L hand post ex's. Treatment Diagnosis: Lumbar and Cervical pain with R>L UE and B LE strength deficits with decrease ROM and decreased  strength  Therapy Prognosis: Good, Fair      Post-Pain Assessment:       Pain Rating (0-10 pain scale):   0/10   Location and pain description same as pre-treatment unless indicated. Action: [x] NA   [] Perform HEP  [] Meds as prescribed  [] Modalities as prescribed   [] Call Physician     GOALS   Patient Goal(s): Patient goals : Decrease pain    Short Term Goals Completed by 2 weeks Goal Status   STG 1 The pt will demonstrate improved postural awareness requiring <25% VC's with exercises In progress   STG 2 Decrease Cervical and lumbar pain 50% to assist with improved functional gains.  In progress       Long Term Goals Completed by 4-6 weeks Goal Status   LTG 1 Indep HEP for symptom management In progress   LTG 2 Pt demo improved overall function by reporting greater than 75% per NDI and Mod Oswestry functional survey score In progress   LTG 3 Pain-free cervical AROM to >/=35 deg all planes allowing an increase in ADL tolerance. In progress   LTG 4 Improve R UE and B LE strength 4/5 to allow patient to reach overhead and lift carry with less difficulty In progress   LTG 5 Pain-free Lumbar AROM to 75% WNL allowing an increase in ADL tolerance. In progress       Plan:  Frequency/Duration:  Plan  Plan Frequency: 1-2  Plan weeks: 4-6 weeks  Current Treatment Recommendations: Strengthening, ROM, Modalities, Integrated dry needling, Home exercise program, Manual Therapy - Soft Tissue Mobilization  Plan Comment: Pt request decrease frequency due to financial implications of therapy cost.  Pt to continue current HEP. See objective section for any therapeutic exercise changes, additions or modifications this date.     Therapy Time:   PT Individual Minutes  Time In: 5618  Time Out: 2193  Minutes: 55  Timed Code Treatment Minutes: 40 Minutes  Procedure Minutes: E-stim x 10'   Timed Activity Minutes Units   Ther Ex 40 3     Electronically signed by Rafa Gillis PTA on 7/15/22 at 8:38 AM EDT

## 2022-07-20 ENCOUNTER — HOSPITAL ENCOUNTER (OUTPATIENT)
Dept: PHYSICAL THERAPY | Age: 62
Setting detail: THERAPIES SERIES
Discharge: HOME | End: 2022-07-20
Payer: COMMERCIAL

## 2022-07-20 PROCEDURE — 97110 THERAPEUTIC EXERCISES: CPT

## 2022-07-20 PROCEDURE — G0283 ELEC STIM OTHER THAN WOUND: HCPCS

## 2022-07-20 ASSESSMENT — PAIN SCALES - GENERAL: PAINLEVEL_OUTOF10: 6

## 2022-07-20 ASSESSMENT — PAIN DESCRIPTION - ORIENTATION: ORIENTATION: LOWER

## 2022-07-20 ASSESSMENT — PAIN DESCRIPTION - LOCATION: LOCATION: BACK;NECK

## 2022-07-20 NOTE — PROGRESS NOTES
[address] [place] [address]  [address]  KPQTI:[phone]  Treatment Note        Date: 2022  Patient: Beba Reynaga  : 1960   Confirmed: Yes  MRN: [id number]  Referring Provider: WATLER Valencia   Medical Diagnosis: Radiculopathy, cervical region [M54.12]  Arthrodesis status [Z98.1]    Treatment Diagnosis: Lumbar and Cervical pain with R>L UE and B LE strength deficits with decrease ROM and decreased  strength    Visit Information:  Insurance: Payor: [employer] / Plan: [employer] - [place] PPO / Product Type: *No Product type* /   PT Visit Information  Onset Date: 21  Total # of Visits to Date: 5  No Show: 0  Canceled Appointment: 0  Progress Note Counter:     Subjective Information:  Subjective: Pt gets relief for a few days after the therapy sessions. Still has the tingling/numbness radiating down her right arm into her fingers. Her neck feels very tight today. Getting a neck injection next Thursday. 6/10 LBP and neck is just really tight.   HEP Compliance:  [x] Good [] Fair [] Poor [] Reports not doing due to:    Pain Screening  Patient Currently in Pain: Yes  Pain Assessment: 0-10  Pain Level: 6 (6/10 LBP, neck is tight)  Pain Location: Back, Neck  Pain Orientation: Lower    Treatment:  Exercises:  Exercises  Exercise 1: cervical stretches - HEP  Exercise 2: supine c-nod 10 x 5\" , c-nod with scap squeeze 10 x 5\" , c-nod with head lift 10 x 3\"  Exercise 3: thoracic mob with half foam roll and B shl flex 10 x 5\"  Exercise 4: thoracic mob with pivot pec stretch 10 x 5\"  Exercise 5: supine chest pull with GTB 10 x 3\"  Exercise 6: LTR 10 x 5\"  Exercise 7: SKTC 5 x 20\"  Exercise 8: HS stretch with strap 5 x 20\"  Exercise 9: TA iso 10 x 5\"  ,  TA march 10 x 3\"  ,  TA SLR 10 x 3\"  Exercise 10: Bridge 10 x 3\"  Exercise 12: HEP given on 22: thoracic mobs, LTR, SKTC, TA iso, TA march  Treatment Reasoning  Limitations addressed: Posture, Flexibility, Strength  Functional ability(s) targeted: Reaching overhead    Modalities:  Electric stimulation, unattended (CPT Q5131286) /  (Medicare)  Patient Position: Seated  E-stim location: Right, Left, Low back  E-stim type: Interferential (IFC)  Post treatment skin assessment: Intact  Untimed (minutes): 10      *Indicates exercise, modality, or manual techniques to be initiated when appropriate      Strength: [x] NT  [] MMT completed:      ROM: [x] NT  [] ROM measurements:      Assessment: Body Structures, Functions, Activity Limitations Requiring Skilled Therapeutic Intervention: Decreased ROM, Decreased strength, Decreased posture, Decreased sensation, Increased pain  Assessment: Pt performed deep cervical flexor training and DLS progression with proper demo and good tolerance. Reported relief post session. Treatment Diagnosis: Lumbar and Cervical pain with R>L UE and B LE strength deficits with decrease ROM and decreased  strength  Therapy Prognosis: Good, Fair      Post-Pain Assessment:       Pain Rating (0-10 pain scale):   0/10   Location and pain description same as pre-treatment unless indicated. Action: [x] NA   [] Perform HEP  [] Meds as prescribed  [] Modalities as prescribed   [] Call Physician     GOALS   Patient Goal(s): Patient goals : Decrease pain    Short Term Goals Completed by 2 weeks Goal Status   STG 1 The pt will demonstrate improved postural awareness requiring <25% VC's with exercises In progress   STG 2 Decrease Cervical and lumbar pain 50% to assist with improved functional gains. In progress       Long Term Goals Completed by 4-6 weeks Goal Status   LTG 1 Indep HEP for symptom management In progress   LTG 2 Pt demo improved overall function by reporting greater than 75% per NDI and Mod Oswestry functional survey score In progress   LTG 3 Pain-free cervical AROM to >/=35 deg all planes allowing an increase in ADL tolerance.  In progress   LTG 4 Improve R UE and B LE strength 4/5 to allow patient to reach overhead and lift carry with less difficulty In progress   LTG 5 Pain-free Lumbar AROM to 75% WNL allowing an increase in ADL tolerance. In progress       Plan:  Frequency/Duration:  Plan  Plan Frequency: 1-2  Plan weeks: 4-6 weeks  Current Treatment Recommendations: Strengthening, ROM, Modalities, Integrated dry needling, Home exercise program, Manual Therapy - Soft Tissue Mobilization  Plan Comment: Pt request decrease frequency due to financial implications of therapy cost.  Pt to continue current HEP. See objective section for any therapeutic exercise changes, additions or modifications this date.     Therapy Time:   PT Individual Minutes  Time In: 0800  Time Out: 1798  Minutes: 52  Timed Code Treatment Minutes: 41 Minutes  Procedure Minutes: E-stim x 10'   Timed Activity Minutes Units   Ther Ex 41 3     Electronically signed by Aleksandra Silver PTA on 7/20/22 at 8:48 AM EDT

## 2022-07-26 ENCOUNTER — HOSPITAL ENCOUNTER (OUTPATIENT)
Dept: PHYSICAL THERAPY | Age: 62
Setting detail: THERAPIES SERIES
Discharge: HOME | End: 2022-07-26
Payer: COMMERCIAL

## 2022-07-26 PROCEDURE — 97110 THERAPEUTIC EXERCISES: CPT

## 2022-07-26 PROCEDURE — 97140 MANUAL THERAPY 1/> REGIONS: CPT

## 2022-07-26 ASSESSMENT — PAIN DESCRIPTION - ORIENTATION: ORIENTATION: LOWER

## 2022-07-26 ASSESSMENT — PAIN DESCRIPTION - LOCATION: LOCATION: BACK

## 2022-07-26 ASSESSMENT — PAIN SCALES - GENERAL: PAINLEVEL_OUTOF10: 8

## 2022-07-26 ASSESSMENT — PAIN DESCRIPTION - DESCRIPTORS: DESCRIPTORS: SHOOTING

## 2022-07-26 NOTE — PROGRESS NOTES
D[facility]  [address] [address]  [address]:[phone]  Treatment Note        Date: 2022  Patient: Liss Goins  : 1960   Confirmed: Yes  MRN: [id number]  Referring Provider: WALTER Meyers   Secondary Referring Provider (If applicable):     Medical Diagnosis: Radiculopathy, cervical region [M54.12]  Arthrodesis status [Z98.1]    Treatment Diagnosis: Lumbar and Cervical pain with R>L UE and B LE strength deficits with decrease ROM and decreased  strength    Visit Information:  Insurance: Payor: Eladio Mediate / Plan: Eladio Mediate - [place] PPO / Product Type: *No Product type* /   PT Visit Information  Onset Date: 21  Total # of Visits to Date: 6  No Show: 0  Canceled Appointment: 0  Progress Note Counter:     Subjective Information:  Subjective: pt reports getting innjections 22. Pt reports that she didnt get as much relief after estim last time. HEP Compliance:  [x] Good [] Fair [] Poor [] Reports not doing due to:    Pain Screening  Patient Currently in Pain: Yes  Pain Assessment: 0-10  Pain Level: 8 (4/10 neck)  Pain Location: Back  Pain Orientation: Lower  Pain Descriptors: Shooting (grabbing)    Treatment:  Exercises:  Exercises  Exercise 2: supine c-nod 10 x 5\" , c-nod with scap squeeze 10 x 5\" , c-nod with head lift 10 x 3\"  Exercise 4: thoracic mob with pivot pec stretch 10 x 5\"  Exercise 5: supine chest pull with GTB 10 x 3\"  Exercise 6: LTR 10 x 5\"  Exercise 7: SKTC 5 x 20\"  Exercise 9: TA iso 10 x 5\"  ,  TA march 10 x 3\"  ,  Exercise 10: Bridge 10 x 5\"  Manual:   Manual Therapy  Soft Tissue Mobilizaton: with and without tennis ball massage lb 10 min    Assessment: Body Structures, Functions, Activity Limitations Requiring Skilled Therapeutic Intervention: Decreased ROM, Decreased strength, Decreased posture, Decreased sensation, Increased pain  Assessment: Focused treatment in LB this date due to increased pain.  Pt witih good bal to exercises with decreased pain after. Pt declined modalitites this date. Treatment Diagnosis: Lumbar and Cervical pain with R>L UE and B LE strength deficits with decrease ROM and decreased  strength  Post-Pain Assessment:       Pain Rating (0-10 pain scale):  3 /10 LB  Location and pain description same as pre-treatment unless indicated. Action: [] NA   [x] Perform HEP  [] Meds as prescribed  [] Modalities as prescribed   [] Call Physician     GOALS   Patient Goal(s):      Short Term Goals Completed by 2 weeks Goal Status   STG 1 The pt will demonstrate improved postural awareness requiring <25% VC's with exercises In progress   STG 2 Decrease Cervical and lumbar pain 50% to assist with improved functional gains. In progress       Long Term Goals Completed by 4-6 weeks Goal Status   LTG 1 Indep HEP for symptom management In progress   LTG 2 Pt demo improved overall function by reporting greater than 75% per NDI and Mod Oswestry functional survey score In progress   LTG 3 Pain-free cervical AROM to >/=35 deg all planes allowing an increase in ADL tolerance. In progress   LTG 4 Improve R UE and B LE strength 4/5 to allow patient to reach overhead and lift carry with less difficulty In progress   LTG 5 Pain-free Lumbar AROM to 75% WNL allowing an increase in ADL tolerance. In progress     Plan:  Frequency/Duration:  Plan  Plan Frequency: 1-2  Plan weeks: 4-6 weeks  Current Treatment Recommendations: Strengthening, ROM, Modalities, Integrated dry needling, Home exercise program, Manual Therapy - Soft Tissue Mobilization  Pt to continue current HEP. See objective section for any therapeutic exercise changes, additions or modifications this date.     Therapy Time:   PT Individual Minutes  Time In: 0800  Time Out: 0840  Minutes: 40  Timed Code Treatment Minutes: 40 Minutes  Timed Activity Minutes Units   Ther Ex 30 2   Manual  10 1     Electronically signed by Weston Brasher PTA on 7/26/22 at 8:48 AM EDT

## 2022-07-28 ENCOUNTER — PROCEDURE VISIT (OUTPATIENT)
Dept: PAIN MANAGEMENT | Age: 62
End: 2022-07-28
Payer: COMMERCIAL

## 2022-07-28 DIAGNOSIS — M54.12 CERVICAL RADICULOPATHY: Primary | ICD-10-CM

## 2022-07-28 PROCEDURE — 62321 NJX INTERLAMINAR CRV/THRC: CPT | Performed by: PHYSICAL MEDICINE & REHABILITATION

## 2022-07-28 RX ORDER — DEXAMETHASONE SODIUM PHOSPHATE 10 MG/ML
10 INJECTION, SOLUTION INTRAMUSCULAR; INTRAVENOUS ONCE
Status: COMPLETED | OUTPATIENT
Start: 2022-07-28 | End: 2022-07-28

## 2022-07-28 RX ORDER — LIDOCAINE HYDROCHLORIDE 10 MG/ML
5 INJECTION, SOLUTION INFILTRATION; PERINEURAL ONCE
Status: COMPLETED | OUTPATIENT
Start: 2022-07-28 | End: 2022-07-28

## 2022-07-28 RX ORDER — SODIUM CHLORIDE 9 MG/ML
3 INJECTION INTRAVENOUS ONCE
Status: COMPLETED | OUTPATIENT
Start: 2022-07-28 | End: 2022-07-28

## 2022-07-28 RX ADMIN — Medication 0.5 MEQ: at 10:33

## 2022-07-28 RX ADMIN — LIDOCAINE HYDROCHLORIDE 5 ML: 10 INJECTION, SOLUTION INFILTRATION; PERINEURAL at 10:33

## 2022-07-28 RX ADMIN — SODIUM CHLORIDE 3 ML: 9 INJECTION INTRAVENOUS at 10:34

## 2022-07-28 RX ADMIN — DEXAMETHASONE SODIUM PHOSPHATE 10 MG: 10 INJECTION, SOLUTION INTRAMUSCULAR; INTRAVENOUS at 10:32

## 2022-07-28 NOTE — PROGRESS NOTES
Cervical Interlaminar Epidural Corticosteroid Injection (ILESI) under Fluoroscopic Guidance         Patient Name: aKtalina Pereyra  : 1960  Date: 2022   Provider: Samia Mercedes MD    PROCEDURE:  Cervical interlaminar epidural corticosteroid injection (ILESI) at the C7/T1 level under fluoroscopic guidance    INDICATIONS: Katalina Pereyra is a 60 y.o. female who presents with symptoms and physical exam findings consistent with cervical radiculopathy. She has severe neck pain with pain into her right arm. A focused physical exam demonstrates right C6 paresthesias with a positive Spurling's maneuver. She confirms a history of anterior spine surgery C5-7. She has had persistent pain that limits her activities of daily living such as upper body dressing. The pain is persistent despite conservative measures including home exercise program. Given her symptoms, physical exam findings, impairment in activities of daily living, and lack of response to conservative measures, consideration for C7/T1 Cervical interlaminar epidural corticosteroid injection under fluoroscopic guidance was given. Discussed the risks including but not limited to bleeding, infection, worsened pain, damage to surrounding structures, side effects, toxicity, allergic reactions to medications used, need for surgery, headache, vision changes, difficulty with chewing and/or swallowing, pneumothorax, immune and stress-response dysfunction, fat necrosis, avascular necrosis, skin pigmentation changes, blood sugar elevation, as well as catastrophic injury such as vision loss, paralysis, spinal cord or plexus injury, cerebral brainstem or spinal cord infarction, intrathecal injection, spinal cord puncture, persistent dural leak, arachnoiditis, stroke, bowel or bladder incontinence, ventilator dependence, loss of use of the arms and/or legs, and death.  Discussed off-label use of corticosteroids and how the Food and Drug Administration (FDA) has not approved corticosteroids for epidural use. Discussed the risks, benefits, alternative procedures, and alternatives to the procedure including no procedure at all. Discussed that we cannot undo any permanent neurologic or orthopaedic damage or change the course of any underlying disease. After thorough discussion, patient expressed understanding and willingness to proceed. Written consent was obtained and is in the chart. Verbal consent to proceed was obtained. DESCRIPTION OF PROCEDURE:  The site was marked. A time out was performed. Fluoroscopy was used to identify the pertinent landmarks. The patient was placed prone on the procedure table. The site was prepped with betadine three times, draped, and maintained in a sterile manner throughout the procedure. Under fluoroscopic guidance the C7/T1 interlaminar space was identified as the first interlaminar space immediately caudal to C5-7 anterior cervical plate. A 27 gauge 1.5 inch needle was used to anesthetize the skin and subcutaneous tissue with 2 mL of 1% preservative free lidocaine. Then a 18 gauge 3.5 inch long Tuohy needle was advanced in a right paramedian fashion under intermittent fluoroscopic guidance. A loss of resistance syringe was attached and the needle was advanced slowly and carefully under multiple fluroscopic views. At the depth of 7cm from the skin there was appropriate loss of resistance. Aspiration was negative for blood or cerebrospinal fluid. Injection of 1 mL of contrast dye demonstrated appropriate epidural spread and was free of any intravascular spread or any other aberrant uptake. Live fluoroscopy was used. Then a 3 mL injectate containing 1 mL of 10 mg of Dexamethasone and 2 mL of 0.9% preservative-free normal saline was injected slowly and without difficulty. The needle was flushed and removed. The site was hemostatic. The site was cleaned and appropriately dressed. The patient tolerated the procedure well.  There were no immediate post procedure complications. She was monitored in the recovery room post procedure. She was at her baseline neuromuscular examination prior to being discharged home in stable condition. Post procedure instructions were given. The patient will follow-up as previously instructed. She will resume anticoagulation tomorrow.               [address] [facility]  Phone [phone]/[facility] [phone]

## 2022-07-28 NOTE — PROGRESS NOTES
This procedure was 30% more difficult and required 30% more work secondary to the patient's habitus. The patient has a BMI of 40. This required increased work for safe and proper positioning upon the fluoroscopy table, increased needle passes for safe and appropriate needle placement, and increased fluoroscopy time and radiation exposure for proper visualization.

## 2022-08-04 ENCOUNTER — HOSPITAL ENCOUNTER (OUTPATIENT)
Dept: PHYSICAL THERAPY | Age: 62
Setting detail: THERAPIES SERIES
Discharge: HOME | End: 2022-08-04
Payer: COMMERCIAL

## 2022-08-04 ENCOUNTER — APPOINTMENT (OUTPATIENT)
Dept: PHYSICAL THERAPY | Age: 62
End: 2022-08-04
Payer: COMMERCIAL

## 2022-08-04 PROCEDURE — 97110 THERAPEUTIC EXERCISES: CPT

## 2022-08-04 PROCEDURE — 97140 MANUAL THERAPY 1/> REGIONS: CPT

## 2022-08-04 ASSESSMENT — PAIN DESCRIPTION - PAIN TYPE: TYPE: CHRONIC PAIN

## 2022-08-04 ASSESSMENT — PAIN SCALES - GENERAL: PAINLEVEL_OUTOF10: 7

## 2022-08-04 ASSESSMENT — PAIN DESCRIPTION - FREQUENCY: FREQUENCY: INTERMITTENT

## 2022-08-04 ASSESSMENT — PAIN DESCRIPTION - DESCRIPTORS: DESCRIPTORS: SHOOTING;NUMBNESS;TINGLING

## 2022-08-04 ASSESSMENT — PAIN DESCRIPTION - LOCATION: LOCATION: BACK;NECK

## 2022-08-04 ASSESSMENT — PAIN DESCRIPTION - ORIENTATION: ORIENTATION: LOWER

## 2022-08-04 NOTE — PROGRESS NOTES
[address] [address]  [address]  MSPII:[phone]  Treatment Note        Date: 2022  Patient: Clara Huerta  : 1960   Confirmed: Yes  MRN: [id number]  Referring Provider: WALTER Calderon   Secondary Referring Provider (If applicable):     Medical Diagnosis: Radiculopathy, cervical region [M54.12]  Arthrodesis status [Z98.1]    Treatment Diagnosis: Lumbar and Cervical pain with R>L UE and B LE strength deficits with decrease ROM and decreased  strength    Visit Information:  Insurance: Payor: [employer] / Plan: [facility] PPO / Product Type: *No Product type* /   PT Visit Information  Onset Date: 21  Total # of Visits to Date: 7  No Show: 0  Canceled Appointment: 0  Progress Note Counter:     Subjective Information:  Subjective: Pt stated that since the injection the R UE is feeling much better since the injection. Minimal relief noted after stretches for her neck and low back. Pt will f/u with  [facility] [place] to discuss MRI for the lumbar area. no pain down the R UE at this present moment. Pt does c/o of R side of neck tightness. HEP Compliance:  [x] Good- completes 1 time a day     [] Fair    [] Poor  [] Reports not doing due to:    Pain Screening  Patient Currently in Pain: Yes  Pain Assessment: 0-10  Pain Level: 7  Best Pain Level: 3  Worst Pain Level: 9  Pain Type: Chronic pain  Pain Location: Back, Neck  Pain Orientation: Lower  Pain Radiating Towards: R UE 2/10  Lumbar 6-7/10 L LE thigh area tingling.   Pain Descriptors: Shooting, Numbness, Tingling  Pain Frequency: Intermittent    Treatment:  Exercises:  Exercises  Exercise 2: supine c-nod 10 x 5\" , c-nod with scap squeeze 10 x 5\" , c-nod with head lift 10 x 3\" HEP  Exercise 4: thoracic mob with pivot pec stretch 10 x 5\"HEP  Exercise 5: supine chest pull with GTB 10 x 3\"HEP  Exercise 6: LTR 10 x 5\"  Exercise 7: SKTC 5 x 20\"  Exercise 9: TA march 10 x 3\"  ,  Exercise 10: Bridge 10 x 5\"  Treatment Reasoning  Limitations addressed: Posture, Flexibility, Strength  Manual:   Manual Therapy  Soft Tissue Mobilizaton: stm 5 mins cervical  Other: objective measures taken ROM/MMT x 25'   *Indicates exercise, modality, or manual techniques to be initiated when appropriate    Objective Measures:       Strength: [] NT  [x] MMT completed:  Strength RLE  R Hip Flexion: 4-/5  R Hip Extension: 4-/5  R Hip ABduction: 4/5  R Hip Internal Rotation: 4-/5  R Hip External Rotation: 4-/5  R Knee Flexion: 4/5, 4+/5  R Knee Extension: 4-/5  Strength LLE  L Hip Flexion: 4-/5  L Hip Extension: 4/5  L Hip ABduction: 4+/5  L Hip Internal Rotation: 4-/5  L Hip External Rotation: 4-/5  L Knee Flexion: 4/5  L Knee Extension: 4/5      ROM: [] NT  [x] ROM measurements:     AROM LLE (degrees)  L Hip Flexion 0-125: 68   AROM RLE (degrees)  R Hip Flexion 0-125: 68       AROM Cervical Spine   Cervical spine general AROM: Flex 26, ext 44, R/L SB 30 pain in all directions. AROM Lumbar Spine   Lumbar spine general AROM: flex 50%, ext 25%, R/L SB 25% with pain R>L     Assessment: Body Structures, Functions, Activity Limitations Requiring Skilled Therapeutic Intervention: Decreased ROM, Decreased strength, Decreased posture, Decreased sensation, Increased pain  Assessment: objective measures taken ROM/MMT UE/LE's. Pt demonstrated improve ROM and strength in the cerivcal and lumbar areas. Weakness noted with R knee extension and B hip IR/ER. Tightness noted R trap with STM, with several trigger points noted. Minimal changes noted after tx. Pt will continue to progress with HEP per her tolerance and f/u with Dr. Tono Hinds to discuss further tx options.   Treatment Diagnosis: Lumbar and Cervical pain with R>L UE and B LE strength deficits with decrease ROM and decreased  strength  Therapy Prognosis: Good, Fair   Post-Pain Assessment:       Pain Rating (0-10 pain scale):   7/10   Location and pain description same as pre-treatment unless indicated. Action: [] NA   [x] Perform HEP  [x] Meds as prescribed  [] Modalities as prescribed   [] Call Physician     GOALS   Patient Goal(s):      Short Term Goals Completed by 2 weeks Goal Status   STG 1 The pt will demonstrate improved postural awareness requiring <25% VC's with exercises Met   STG 2 Decrease Cervical and lumbar pain 50% to assist with improved functional gains. Not Met     Long Term Goals Completed by 4-6 weeks Goal Status   LTG 1 Indep HEP for symptom management Met   LTG 2 Pt demo improved overall function by reporting greater than 75% per NDI and Mod Oswestry functional survey score Met   LTG 3 Pain-free cervical AROM to >/=35 deg all planes allowing an increase in ADL tolerance. Not Met   LTG 4 Improve R UE and B LE strength 4/5 to allow patient to reach overhead and lift carry with less difficulty Met   LTG 5 Pain-free Lumbar AROM to 75% WNL allowing an increase in ADL tolerance. Not Met     Plan:  Frequency/Duration:  Plan  Plan Comment: Pt D/C'd from PT  Pt to continue current HEP. See objective section for any therapeutic exercise changes, additions or modifications this date.     Therapy Time:      PT Individual Minutes  Time In: 1600  Time Out: 8767  Minutes: 75  Timed Code Treatment Minutes: 45 Minutes    Timed Activity Minutes Units   Ther Ex 15 1   Manual  30 2     Electronically signed by Alia Thomason PTA on 8/4/22 at 4:37 PM EDT

## 2022-08-04 NOTE — PROGRESS NOTES
ABduction: 4/5  R Hip Internal Rotation: 4-/5  R Hip External Rotation: 4-/5  R Knee Flexion: 4/5, 4+/5  R Knee Extension: 4-/5  Strength LLE  L Hip Flexion: 4-/5  L Hip Extension: 4/5  L Hip ABduction: 4+/5  L Hip Internal Rotation: 4-/5  L Hip External Rotation: 4-/5  L Knee Flexion: 4/5  L Knee Extension: 4/5 Met   Long term goal 5: Pain-free Lumbar AROM to 75% WNL allowing an increase in ADL tolerance. flex 50%, ext 25%, R/L SB 25% with pain R>L     Not Met     Body Structures, Functions, Activity Limitations Requiring Skilled Therapeutic Intervention: Decreased ROM, Decreased strength, Decreased posture, Decreased sensation, Increased pain  Assessment: Pt demonstrated improve ROM and strength in the cerivcal and lumbar areas. Weakness noted with R knee extension and B hip IR/ER. Tightness noted R trap with STM, with several trigger points noted. Minimal changes noted after tx. Pt will continue to progress with HEP per her tolerance and f/u with Dr. Lacey Mack to discuss further tx options. Therapy Prognosis: Good, Fair    Frequency/Duration:  Plan Comment: Pt D/C'd from PT               Patient Status:[] Continue/ Initiate plan of Care     [x] Discharge PT. Recommend pt continue with HEP. [] Additional visits requested, Please re-certify for additional visits:     [] Hold     Objective information provided by: Electronically signed by Tisha Ngo PTA on 8/4/22 at 5:22 PM EDT        Signature: Electronically signed by Wagner Gould PT on 8/5/2022 at 9:05 AM      If you have any questions or concerns, please don't hesitate to call. Thank you for your referral.    I have reviewed this plan of care and certify a need for medically necessary rehabilitation services.     Physician Signature:__________________________________________________________  Date:  Please sign and return

## 2022-09-06 ENCOUNTER — OFFICE VISIT (OUTPATIENT)
Dept: PAIN MANAGEMENT | Age: 62
End: 2022-09-06
Payer: COMMERCIAL

## 2022-09-06 VITALS
TEMPERATURE: 97.3 F | SYSTOLIC BLOOD PRESSURE: 126 MMHG | DIASTOLIC BLOOD PRESSURE: 82 MMHG | HEIGHT: 63 IN | WEIGHT: 225 LBS | BODY MASS INDEX: 39.87 KG/M2

## 2022-09-06 DIAGNOSIS — M47.817 LUMBOSACRAL SPONDYLOSIS WITHOUT MYELOPATHY: Primary | ICD-10-CM

## 2022-09-06 DIAGNOSIS — M50.20 PROTRUSION OF CERVICAL INTERVERTEBRAL DISC: ICD-10-CM

## 2022-09-06 DIAGNOSIS — M51.36 DDD (DEGENERATIVE DISC DISEASE), LUMBAR: ICD-10-CM

## 2022-09-06 PROCEDURE — 99214 OFFICE O/P EST MOD 30 MIN: CPT | Performed by: NURSE PRACTITIONER

## 2022-09-06 ASSESSMENT — ENCOUNTER SYMPTOMS
DIARRHEA: 0
TROUBLE SWALLOWING: 0
BACK PAIN: 1
SHORTNESS OF BREATH: 0
EYES NEGATIVE: 1
COUGH: 0
CONSTIPATION: 0
GASTROINTESTINAL NEGATIVE: 1

## 2022-09-06 NOTE — PROGRESS NOTES
Ray Smith  (1960)    2022    Subjective:     Ray Smith is 60 y.o. female who complains today of:    Chief Complaint   Patient presents with    Back Pain     Records from Dr. Elisabet Abdul  MRI Lumbar- [facility]     Shoulder Pain         Allergies:  Adhesive tape, Penicillins, and Venlafaxine    Past Medical History:   Diagnosis Date    Cervical disc herniation 3/22/2018    Cervical radiculopathy 3/19/2018    Cervical spondylitis with radiculitis ([address] 3/19/2018    Foraminal stenosis of cervical region 3/22/2018    Migraine aura without headache     dizziness, neck pain    Mixed hyperlipidemia 2022    PONV (postoperative nausea and vomiting)     patient needs anti-emetics to prevent triggering migraine    POTS (postural orthostatic tachycardia syndrome)     no symptoms in last 4 years    Right arm numbness 3/19/2018    Sleep apnea 3/23/2018    On C-Pap     Past Surgical History:   Procedure Laterality Date    CARDIAC CATHETERIZATION      ENDOMETRIAL ABLATION      MN LAMINECTOMY,>2 SGMT,CERVICAL N/A 3/26/2018    RIGHT C6-7,   RIGHT C7-T1, LAMINECTOMY, FORAMINOTOMY. performed by Mansi Donis MD at [address] History   Problem Relation Age of Onset    Arthritis Mother     High Blood Pressure Mother     Heart Attack Father     Diabetes Father     Diabetes Sister     No Known Problems Daughter     Breast Cancer Paternal Grandmother      Social History     Socioeconomic History    Marital status:       Spouse name: Not on file    Number of children: Not on file    Years of education: Not on file    Highest education level: Not on file   Occupational History    Not on file   Tobacco Use    Smoking status: Former     Packs/day: 0.50     Years: 15.00     Pack years: 7.50     Types: Cigarettes     Quit date: 12     Years since quittin.7    Smokeless tobacco: Never   Substance and Sexual Activity    Alcohol use: Yes     Comment: rarely    Drug use: No    Sexual activity: Never   Other Topics Concern    Not on file   Social History Narrative    Not on file     Social Determinants of Health     Financial Resource Strain: Not on file   Food Insecurity: Not on file   Transportation Needs: Not on file   Physical Activity: Not on file   Stress: Not on file   Social Connections: Not on file   Intimate Partner Violence: Not on file   Housing Stability: Not on file       Current Outpatient Medications on File Prior to Visit   Medication Sig Dispense Refill    meloxicam (MOBIC) 15 MG tablet Take 1 tablet by mouth daily 30 tablet 3    predniSONE (DELTASONE) 10 MG tablet 4 tabs for 3 days, then 3 tabs for 3 days, then 2 tabs for 3 days, then 1 tab for 3 days, then DC. 30 tablet 0    predniSONE (DELTASONE) 10 MG tablet Take 1 tablet by mouth daily 1 tab TID x 3 days, 1 tab BID x 3 days, 1 tab QD x 3 days 18 tablet 0    predniSONE (DELTASONE) 10 MG tablet Take 1 tablet by mouth daily 1 tab TID x 3 days, 1 tab BID x 3 days, 1 tab QD x 3 days 18 tablet 0    gabapentin (NEURONTIN) 300 MG capsule Take 1 capsule by mouth 2 times daily for 30 days. . 60 capsule 1    predniSONE (DELTASONE) 10 MG tablet Take 1 tablet by mouth daily 1 tab TID x 3 days, 1 tab BID x 3 days, 1 tab QD x 3 days 18 tablet 0    buPROPion (WELLBUTRIN XL) 150 MG extended release tablet Take 150 mg by mouth every morning      omeprazole (PRILOSEC) 40 MG delayed release capsule Take 40 mg by mouth daily      diazepam (VALIUM) 10 MG tablet Take 10 mg by mouth nightly as needed for Sleep.      ibuprofen (ADVIL;MOTRIN) 800 MG tablet Take 800 mg by mouth every 6 hours as needed for Pain      Cholecalciferol (VITAMIN D3) 5000 units TABS Take 5,000 Units by mouth daily      naratriptan (AMERGE) 2.5 MG tablet Take 2.5 mg by mouth once as needed for Migraine 2.5 mg at onset of headache, may repeat in 4 hours if needed      PREDNISONE PO Take by mouth daily 4 days 40mg, 4 days 30mg, 4 days 20mg, 4 days 10mg,      PREDNISONE, STANLEY, PO Take 30 mg by mouth Started at 40 mg with taper to 10 mg.      aspirin 81 MG tablet Take 81 mg by mouth      metoprolol succinate (TOPROL XL) 50 MG extended release tablet TAKE ONE AND ONE-HALF TABLETS DAILY       Current Facility-Administered Medications on File Prior to Visit   Medication Dose Route Frequency Provider Last Rate Last Admin    iopamidol (ISOVUE-300) 61 % injection 1 mL  1 mL Other ONCE PRN Robby Morales MD               Pt presents today for a f/u of her pain. PCP is Dr. Nicky Jaeger. Patient was seen on 7/28/2022 by Dr. Yohana Chapa for cervical interlaminar epidural steroid injection at C7-T1. She is now able to lift her arm and doesn't have radiating pain in Rt arm. Numbness better in hand. She did see Dr. Steven Damico. Brings notes with her. She says she is trying to hold off on any further surgery if possible. She is having more low back pain and her surgeon is wanting to consider RF ablation. Her daily pain is across her lower back. \"It feels like bone pain\" and says only very occasionally does she get some pain in Lt upper thigh. MRI report of lumbar spine from 8/24/22 shows multi-level degenerative changes, worse L3-4, L4-5 severe degenerative changes and stenosis. She is going to start PT. She says her daughter is getting  on [employer] and wanting to retire next spring if possible. She has been doing physical therapy. Last saw this NP in June. She was advised to follow-up with Dr. Steven Damico, neurosurgery at that time. She had  ACDF C5-6, 6-7 on 10/28/19 with Dr Kalin Barrow Hx of migraines, ELENA and CPAP, C6-7, C7-T1 foraminotomy on the right side 2018 and uterine ablation 2012. She says she is here today for similar pain that radiates into Rt arm to hand. She says these Sx started about 6 months. Has seen Dr. Steven Damico. XR's and MRI done. She did start PT at [address] for her neck as well.  Just had cervical MRI on 6/21/22 report showing C5-7 fusion, C3-4 bulging disc, C4-5 central disc protrusion and C7-T1 disc protrusion towards Lt per report. Says Dr. Yemi Wilson office discussed possible ernesto injection. She is on baby ASA Hx of cardiac cath in 2011. Surg Hx of bladder sling and cervical fusion. Uterine ablation  PMH: GERD, ulcer, migraine. Cannot use NSAIDS. Gets diazepam for her migraines she says. She evidently has low back pain as well. XR report of low back from 5/23/22 shows significant facet arthropathy L4-5 and XR report of neck from same date shows diffuse degenerative changes and mild motion at C4-5, fusion intact. Pt feels pain level 7/10. Pt feels that standing, walking,prolonged exercise makes the pain worse, and change of position, recent neck injection makes the pain better. Pt denies radiating numbness and tingling. Denies recent falls, injuries or trauma. Pt denies new weakness. Pt reports PT has been done and will be starting. Review of Systems   Constitutional: Negative. Negative for fatigue. HENT: Negative. Negative for trouble swallowing. Eyes: Negative. Respiratory:  Negative for cough and shortness of breath. Cardiovascular:  Negative for chest pain. Gastrointestinal: Negative. Negative for constipation and diarrhea. Endocrine: Negative. Genitourinary: Negative. Musculoskeletal:  Positive for back pain and neck pain. Skin: Negative. Neurological:  Negative for dizziness, weakness and headaches. Hematological: Negative. Psychiatric/Behavioral: Negative. Objective:     Vitals:  /82 (Site: Left Upper Arm, Position: Sitting)   Temp 97.3 °F (36.3 °C)   Ht 5' 3\" (1.6 m)   Wt 225 lb (102.1 kg)   LMP  (LMP Unknown)   BMI 39.86 kg/m² Pain Score:   7      Physical Exam  Vitals and nursing note reviewed. This is a pleasant female who answers questions appropriately and follows commands. Pt is alert and oriented x 3. Recent and remote memory is intact.   Mood and affect, judgement and insight are normal.  No signs of distress, no dyspnea or SOB noted. HEENT: PERRL. Neck is supple, trachea midline. No lymphadenopathy noted. Decreased ROM with flexion, extension and rotation of cervical spine. Tightness in trapezius with palpation noted. Not too tender with palpation over cervical spine. Negative Spurling's maneuver. Positive Carolyn's sign. Strong grasp B/L. Strength is functional in UE bilaterally. Pulses are intact. Surgical scar noted neck. Decreased ROM with flexion and extension of low back. Tender with palpation to lumbar spine with palpation bilaterally with positive provocative maneuvers noted. Negative SLR. Tightness in both hamstrings noted. Balance and coordination normal.  Strength is functional for ambulation. Cranial nerves II-XII are intact. Assessment:      Diagnosis Orders   1. Lumbosacral spondylosis without myelopathy  CHG FLUOR NEEDLE/CATH SPINE/PARASPINAL DX/THER ADDON    OH INJ DX/THER AGNT PARAVERT FACET JOINT, LUMBAR/SAC, 1ST LEVEL    OH INJ DX/THER AGNT PARAVERT FACET JOINT, LUMBAR/SAC, 2ND LEVEL    OH INJ DX/THER AGNT PARAVERT FACET JOINT, LUMBAR/SAC, ADD LEVEL      2. DDD (degenerative disc disease), lumbar        3. Protrusion of cervical intervertebral disc            Plan:          No orders of the defined types were placed in this encounter.       Orders Placed This Encounter   Procedures    CHG FLUOR NEEDLE/CATH SPINE/PARASPINAL DX/THER ADDON     Standing Status:   Future     Standing Expiration Date:   12/5/2022    OH INJ DX/THER AGNT PARAVERT FACET JOINT, LUMBAR/SAC, 1ST LEVEL     B/L L(2)3,4,5 MBB with SK     Standing Status:   Future     Standing Expiration Date:   12/5/2022    OH INJ DX/THER AGNT PARAVERT FACET JOINT, LUMBAR/SAC, 2ND LEVEL     Standing Status:   Future     Standing Expiration Date:   12/5/2022    OH INJ DX/THER AGNT PARAVERT FACET JOINT, LUMBAR/SAC, ADD LEVEL     Standing Status:   Future     Standing Expiration Date:   12/5/2022     Discussed options with the patient today. Anatomic model pathology was shown and reviewed with pt. Pleased her cervical ernesto offered significant relief. Reviewed Dr. Clayton Lesch Smith's notes and MRI reports with pt in detail. Education reviewed and given to pt on RF ablation. We will go ahead and order diagnostic bilateral L2, L3, L4, L5 medial branch blocks with Dr. Stefan Machado to see if the patient is a candidate for RF ablation. she has failed conservative treatment in the past. Anatomic model of pathology was shown. Risks and benefits of the procedure were discussed. All questions were answered and patient understands and agrees with the plan. All questions were answered. Discussed home exercise program and  smoking cessation. Relevant imaging and pain generators reviewed. Pt verbalized understanding and agrees with above plan. Pt has chronic pain. OARRS was reviewed. This NP saw pt under direct supervision of Dr. Stefan Machado. Follow up:  Return for f/u after procedure and reassess pain.     Ramesh Wright, APRN - CNP

## 2022-09-08 ENCOUNTER — TELEPHONE (OUTPATIENT)
Dept: PAIN MANAGEMENT | Age: 62
End: 2022-09-08

## 2022-09-08 NOTE — TELEPHONE ENCOUNTER
ORDER PLACED:    Date: 9/6/22  Description: BILAT L2,3,4,5 MBB  Order Number: [id number]  Ordering Provider: Jared Seay  Performing Provider: [facility]  CPT Codes: 20920,29049,57858  ICD10 Codes: U84.210

## 2022-09-08 NOTE — TELEPHONE ENCOUNTER
BENEFITS: BILAT L2,3,4,5 MBB    Insurance: [address] [place] [address] [address]  Phone: [phone]  Contact Name: Tonia Sewell  Effective Date: 7/1/22     Plan year: CAL YEAR  Deductible: N/A      Deductible Met: N/A  Allowed/benefits paid at: 100% AFTER $75 COPAY  OOP: $6850 WITH $5457.21 MET  Freq Limits: MEDICAL NECESSITY  Prior Auth Requirement: NO AUTH REQUIRED    Notes:     Call Reference #: [id number]    Time of call: 1:42PM

## 2022-09-20 ENCOUNTER — PROCEDURE VISIT (OUTPATIENT)
Dept: PAIN MANAGEMENT | Age: 62
End: 2022-09-20
Payer: COMMERCIAL

## 2022-09-20 DIAGNOSIS — M47.817 LUMBOSACRAL SPONDYLOSIS WITHOUT MYELOPATHY: ICD-10-CM

## 2022-09-20 PROCEDURE — 64493 INJ PARAVERT F JNT L/S 1 LEV: CPT | Performed by: PAIN MEDICINE

## 2022-09-20 PROCEDURE — 64494 INJ PARAVERT F JNT L/S 2 LEV: CPT | Performed by: PAIN MEDICINE

## 2022-09-20 RX ORDER — BETAMETHASONE SODIUM PHOSPHATE AND BETAMETHASONE ACETATE 3; 3 MG/ML; MG/ML
6 INJECTION, SUSPENSION INTRA-ARTICULAR; INTRALESIONAL; INTRAMUSCULAR; SOFT TISSUE ONCE
Status: COMPLETED | OUTPATIENT
Start: 2022-09-20 | End: 2022-09-20

## 2022-09-20 RX ORDER — LIDOCAINE HYDROCHLORIDE 10 MG/ML
10 INJECTION, SOLUTION EPIDURAL; INFILTRATION; INTRACAUDAL; PERINEURAL ONCE
Status: COMPLETED | OUTPATIENT
Start: 2022-09-20 | End: 2022-09-20

## 2022-09-20 RX ADMIN — LIDOCAINE HYDROCHLORIDE 10 MG: 10 INJECTION, SOLUTION EPIDURAL; INFILTRATION; INTRACAUDAL; PERINEURAL at 15:40

## 2022-09-20 RX ADMIN — BETAMETHASONE SODIUM PHOSPHATE AND BETAMETHASONE ACETATE 6 MG: 3; 3 INJECTION, SUSPENSION INTRA-ARTICULAR; INTRALESIONAL; INTRAMUSCULAR; SOFT TISSUE at 15:41

## 2022-09-20 NOTE — PROGRESS NOTES
[facility]) Physicians  Neurosurgery and Pain [facility]  [address], [address] [facility] , [address] [facility], [address] [address]: [phone]  F: [phone]      [address]      Provider: Radha Morrison DO          Patient Name: Gisele Felipe : 1960        Date: 2022       Gisele Felipe is here today for interventional pain management. Standard ASIPP guidelines were followed and sterile technique used. Area was cleaned with Betadine x3. Informed consent was obtained. Fluoroscopic guidance was used for this procedure. Junction of superior articular process and transverse process was identified. For L5 dorsal primary ramus groove of sacral ala and SAP of S1 was identified. Appropriate length spinal needle was used and advanced to correct anatomic location. Negative aspiration was achieved. At each site approximately 1/2cc of 1% preservative free Lidocaine was injected without difficulty. 6 mg Celestone added. Patient tolerated the procedure well, no obvious complications occurred during the procedure. Patient was appropriately monitored and discharged home in stable condition with their usual motor strength. The patient will keep close track of pain over the next several hours and call our office tomorrow and let us know what percentage of pain relief is experienced. Post op Instructions were given to patient. Relevant and recent imaging reviewed with patient today. [x] Bilateral [] T12 [] S1      [] L1 [] S2     [] Right [] L2 [] S3      [x] L3      [] Left [x] L4         [x] L5 [] S. I.                        May need second MBB    Radha Morrison DO

## 2022-11-15 RX ORDER — METHYLPREDNISOLONE 4 MG/1
TABLET ORAL
Qty: 1 KIT | Refills: 0 | Status: SHIPPED | OUTPATIENT
Start: 2022-11-15 | End: 2022-11-21

## 2022-11-30 ENCOUNTER — HOSPITAL ENCOUNTER (OUTPATIENT)
Dept: WOMENS IMAGING | Age: 62
Discharge: HOME | End: 2022-12-02
Payer: COMMERCIAL

## 2022-11-30 DIAGNOSIS — Z12.31 ENCOUNTER FOR SCREENING MAMMOGRAM FOR MALIGNANT NEOPLASM OF BREAST: ICD-10-CM

## 2022-11-30 PROCEDURE — 77063 BREAST TOMOSYNTHESIS BI: CPT

## 2022-12-06 ENCOUNTER — PROCEDURE VISIT (OUTPATIENT)
Dept: PAIN MANAGEMENT | Age: 62
End: 2022-12-06

## 2022-12-06 DIAGNOSIS — M47.817 LUMBOSACRAL SPONDYLOSIS WITHOUT MYELOPATHY: Primary | ICD-10-CM

## 2022-12-06 RX ORDER — BETAMETHASONE SODIUM PHOSPHATE AND BETAMETHASONE ACETATE 3; 3 MG/ML; MG/ML
6 INJECTION, SUSPENSION INTRA-ARTICULAR; INTRALESIONAL; INTRAMUSCULAR; SOFT TISSUE ONCE
Status: COMPLETED | OUTPATIENT
Start: 2022-12-06 | End: 2022-12-06

## 2022-12-06 RX ORDER — LIDOCAINE HYDROCHLORIDE 10 MG/ML
10 INJECTION, SOLUTION EPIDURAL; INFILTRATION; INTRACAUDAL; PERINEURAL ONCE
Status: COMPLETED | OUTPATIENT
Start: 2022-12-06 | End: 2022-12-06

## 2022-12-06 RX ADMIN — BETAMETHASONE SODIUM PHOSPHATE AND BETAMETHASONE ACETATE 6 MG: 3; 3 INJECTION, SUSPENSION INTRA-ARTICULAR; INTRALESIONAL; INTRAMUSCULAR; SOFT TISSUE at 16:15

## 2022-12-06 RX ADMIN — LIDOCAINE HYDROCHLORIDE 10 MG: 10 INJECTION, SOLUTION EPIDURAL; INFILTRATION; INTRACAUDAL; PERINEURAL at 16:14

## 2022-12-06 NOTE — PROGRESS NOTES
[facility]) Physicians  Neurosurgery and Pain [facility]  [address], [address] [facility] , [address] [facility], [place] [address]: [phone]  F: [phone]      [address]      Provider: Dylan Gutierres DO          Patient Name: Ester Damian : 1960        Date: 2022       Ester Damian is here today for interventional pain management. Standard ASIPP guidelines were followed and sterile technique used. Area was cleaned with Betadine x3. Informed consent was obtained. Fluoroscopic guidance was used for this procedure. Junction of superior articular process and transverse process was identified. For L5 dorsal primary ramus groove of sacral ala and SAP of S1 was identified. Appropriate length spinal needle was used and advanced to correct anatomic location. Negative aspiration was achieved. At each site approximately 1/2cc of 1% preservative free Lidocaine was injected without difficulty. 6 mg Celestone added. Patient tolerated the procedure well, no obvious complications occurred during the procedure. Patient was appropriately monitored and discharged home in stable condition with their usual motor strength. The patient will keep close track of pain over the next several hours and call our office tomorrow and let us know what percentage of pain relief is experienced. Post op Instructions were given to patient. Relevant and recent imaging reviewed with patient today. [x] Bilateral [] T12 [] S1      [] L1 [] S2     [] Right [] L2 [] S3      [x] L3      [] Left [x] L4         [x] L5 [] S.I. May need lumbar RFA depending on how long test blocks work.               Dylan Gutierres DO
66

## 2022-12-12 ENCOUNTER — TELEPHONE (OUTPATIENT)
Dept: PAIN MANAGEMENT | Age: 62
End: 2022-12-12

## 2022-12-12 NOTE — TELEPHONE ENCOUNTER
LEFT C7-T1 ILESI - no auth    Appointment was cancelled per provider. We are waiting on the needles to come in for the procedure. We will contact the patient to reschedule.

## 2022-12-15 ENCOUNTER — TELEPHONE (OUTPATIENT)
Dept: PAIN MANAGEMENT | Age: 62
End: 2022-12-15

## 2022-12-15 NOTE — TELEPHONE ENCOUNTER
LEFT C7-T1 ILESI - no auth    Patient had to be cancelled due to the needles being on back order. Per Dr Stringer Miss patient can be scheduled. I called and left message for the patient to please contact the office to set up the appointment.

## 2022-12-15 NOTE — TELEPHONE ENCOUNTER
LEFT C7-T1 ILESI - no auth     SORRY THIS INJECTION IS WITH DR Marce Jacobsen, WE ARE STILL WAITING ON THE CORRECT NEEDLES.

## 2022-12-20 NOTE — TELEPHONE ENCOUNTER
We do not have an update on when we will have supplies. We will call the patient when we are able to schedule.

## 2022-12-27 ENCOUNTER — TELEPHONE (OUTPATIENT)
Dept: PAIN MANAGEMENT | Age: 62
End: 2022-12-27

## 2022-12-27 NOTE — TELEPHONE ENCOUNTER
Patient states she received 70% pain relief for a week following the lumbar MBB. Pt asks if she can schedule the RFA?

## 2022-12-29 NOTE — TELEPHONE ENCOUNTER
Patient is aware of we are waiting on the supplies, patient will check back next week to see if the needles came in

## 2022-12-29 NOTE — TELEPHONE ENCOUNTER
Yes, but does she need authorization? Does she need to be seen in the office to get the authorization?

## 2023-01-04 NOTE — TELEPHONE ENCOUNTER
Pt took appt with Adela Garza for the cervical she is in pain and does not want to wait any longer for the [facility]

## 2023-01-05 ENCOUNTER — PATIENT MESSAGE (OUTPATIENT)
Dept: PAIN MANAGEMENT | Age: 63
End: 2023-01-05

## 2023-01-05 ENCOUNTER — PROCEDURE VISIT (OUTPATIENT)
Dept: PAIN MANAGEMENT | Age: 63
End: 2023-01-05

## 2023-01-05 ENCOUNTER — TELEPHONE (OUTPATIENT)
Dept: PAIN MANAGEMENT | Age: 63
End: 2023-01-05

## 2023-01-05 DIAGNOSIS — M54.12 CERVICAL RADICULOPATHY: Primary | ICD-10-CM

## 2023-01-05 RX ORDER — LIDOCAINE HYDROCHLORIDE 10 MG/ML
10 INJECTION, SOLUTION EPIDURAL; INFILTRATION; INTRACAUDAL; PERINEURAL ONCE
Status: COMPLETED | OUTPATIENT
Start: 2023-01-05 | End: 2023-01-05

## 2023-01-05 RX ORDER — TRIAMCINOLONE ACETONIDE 40 MG/ML
40 INJECTION, SUSPENSION INTRA-ARTICULAR; INTRAMUSCULAR ONCE
Status: COMPLETED | OUTPATIENT
Start: 2023-01-05 | End: 2023-01-05

## 2023-01-05 RX ORDER — BUPIVACAINE HYDROCHLORIDE 2.5 MG/ML
30 INJECTION, SOLUTION INFILTRATION; PERINEURAL ONCE
Status: COMPLETED | OUTPATIENT
Start: 2023-01-05 | End: 2023-01-05

## 2023-01-05 RX ADMIN — BUPIVACAINE HYDROCHLORIDE 75 MG: 2.5 INJECTION, SOLUTION INFILTRATION; PERINEURAL at 15:23

## 2023-01-05 RX ADMIN — TRIAMCINOLONE ACETONIDE 40 MG: 40 INJECTION, SUSPENSION INTRA-ARTICULAR; INTRAMUSCULAR at 15:22

## 2023-01-05 RX ADMIN — LIDOCAINE HYDROCHLORIDE 10 MG: 10 INJECTION, SOLUTION EPIDURAL; INFILTRATION; INTRACAUDAL; PERINEURAL at 15:23

## 2023-01-05 NOTE — TELEPHONE ENCOUNTER
BENEFITS:    Insurance: [employer]  Phone: [phone]  Contact Name: Murlean Osgood  Effective Date: 1/1/23     Plan year: CAL YEAR  Deductible: $3000      Deductible Met: $0  Allowed/benefits paid at: 100% AFTER $75 COPAY  OOP: $6500 WITH $0 MET  Freq Limits: MEDICAL NECESSITY  Prior Auth Requirement: Jessie Garcia REQUIRED FOR [id number], [id number]    Notes: =    Call Reference #: [id number]    Time of call: 8:20AM

## 2023-01-05 NOTE — PROGRESS NOTES
[facility]) Physicians  Neurosurgery and Pain [facility]  [address], [address] [facility] , [address] [facility], [place] [address]: [phone]  F: [phone]      [address]      Provider: Ramón Machado MD          Patient Name: Beba Reynaga : 1960        Date: 2023   Pt presents today for a f/u of her pain. PCP is Dr. Kandice Rosen. Patient was last seen in  by this NP and Dr. Shasta Antony. She had  ACDF C5-6, 6-7 on 10/28/19 with Dr Shasta Antony. Hx of migraines, ELENA and CPAP, C6-7, C7-T1 foraminotomy on the right side  and uterine ablation . She says she is here today for similar pain that radiates into Rt arm to hand. She says these Sx started about 6 months. Has seen Dr. Deneen Carter. XR's and MRI done. She did start PT at [address] [facility] for her neck as well. Just had cervical MRI on 22 report showing C5-7 fusion, C3-4 bulging disc, C4-5 central disc protrusion and C7-T1 disc protrusion towards Lt per report. Says Dr. Julius Spears office discussed possible ernesto injection. Had an injection in 2022 with relief, here for the r Cervical Epidural reffered for this By Dr Miguel Ángel [employer]        Cervical Epidural Steroid injection:  Pt explained of Procedure risks and complications discussed, concerns addressed. Informed consent obtained. Prone on Loulou table, Skin marked for need le placement using Fluroscopy. Prepped with betadine, anesthetised with 3cc 2% Lidocaine. 20G Tuhoy advanced under fluroscopic guidance and bounced off th C7 lamina, and advanced , Final pojistion confirmed with JUAN,  Contrast injection shows good epidural spread, injected 40mg kenalog and 2cc 0.25% bupivacaine with good relief. Washed the skin,   discharged pt home in stable condition.               Ramón Machado MD

## 2023-01-05 NOTE — TELEPHONE ENCOUNTER
From: Mary Cole  To: Dr. Travis Bailey: 1/5/2023 7:03 AM EST  Subject: Cervical injection    Im scheduled today at 3pm for cervical injections with Dr Ken Rosas. Also Dr Kim Garcia is trying to pre auth lumbar nerve ablation. My insurance changed to [facility] [place] . Policy# [id number]    Group# [id number]    Pre Deb Moscoso phone# [phone]    Please let me know if I can still come today, [phone].   Thank you  Brandon Underwood  [birth year]

## 2023-01-10 ENCOUNTER — OFFICE VISIT (OUTPATIENT)
Dept: PAIN MANAGEMENT | Age: 63
End: 2023-01-10

## 2023-01-10 DIAGNOSIS — M47.817 LUMBOSACRAL SPONDYLOSIS WITHOUT MYELOPATHY: Primary | ICD-10-CM

## 2023-01-10 RX ORDER — BETAMETHASONE SODIUM PHOSPHATE AND BETAMETHASONE ACETATE 3; 3 MG/ML; MG/ML
6 INJECTION, SUSPENSION INTRA-ARTICULAR; INTRALESIONAL; INTRAMUSCULAR; SOFT TISSUE ONCE
Status: COMPLETED | OUTPATIENT
Start: 2023-01-10 | End: 2023-01-10

## 2023-01-10 RX ORDER — LIDOCAINE HYDROCHLORIDE 10 MG/ML
10 INJECTION, SOLUTION EPIDURAL; INFILTRATION; INTRACAUDAL; PERINEURAL ONCE
Status: COMPLETED | OUTPATIENT
Start: 2023-01-10 | End: 2023-01-10

## 2023-01-10 RX ADMIN — LIDOCAINE HYDROCHLORIDE 10 MG: 10 INJECTION, SOLUTION EPIDURAL; INFILTRATION; INTRACAUDAL; PERINEURAL at 16:08

## 2023-01-10 RX ADMIN — BETAMETHASONE SODIUM PHOSPHATE AND BETAMETHASONE ACETATE 6 MG: 3; 3 INJECTION, SUSPENSION INTRA-ARTICULAR; INTRALESIONAL; INTRAMUSCULAR; SOFT TISSUE at 16:08

## 2023-01-10 NOTE — PROGRESS NOTES
[facility]) Physicians  Neurosurgery and Pain [address] [facility] [place] [address]: [phone]  F: [phone]      Lumbar Radio Frequency Ablation     Provider: eMri Ruffin DO          Patient Name: Claudette Fleischer : 1960        Date: 1/10/2023      Claudette Fleischer is here today for interventional pain management. Standard ASIPP guidelines were followed and sterile technique used. Area was cleaned with Betadine x3. Informed consent was obtained. Fluoroscopic guidance was used for this procedure. Multiple views of fluoroscopy were used during procedure to assist with needle placement. Appropriate sized RF 10mm active tip needle was used and advance to appropriate anatomic location. There was appropriate multifidus contraction noted with motor stimulation at 2 Hz between 0.5-1.5 volts. No limb or gluteal contraction was noted taking it up to 3.5 volts. Prior to lesioning at 80 degrees Celsius for 90 seconds, approximately 0.75mg/1mg of Celestone and ½ cc of 1% preservative free Lidocaine was injected. Impedance was between 200-500 ohms during the procedure. Patient tolerated the procedure well, no obvious complications occurred during the procedure. Patient was appropriately monitored and discharged home in stable condition with their usual motor strength. Post Op instructions were given to patient.           [] Bilateral [] T11 [] L1 [] S1     [] T12 [] L2 [] S2    [x] Right  [x] L3 [] S3      [x] L4 [] S4    [] Left  [x] L5                              Meri Ruffin DO

## 2023-01-24 ENCOUNTER — OFFICE VISIT (OUTPATIENT)
Dept: PAIN MANAGEMENT | Age: 63
End: 2023-01-24

## 2023-01-24 DIAGNOSIS — M47.817 LUMBOSACRAL SPONDYLOSIS WITHOUT MYELOPATHY: Primary | ICD-10-CM

## 2023-01-24 RX ORDER — BETAMETHASONE SODIUM PHOSPHATE AND BETAMETHASONE ACETATE 3; 3 MG/ML; MG/ML
6 INJECTION, SUSPENSION INTRA-ARTICULAR; INTRALESIONAL; INTRAMUSCULAR; SOFT TISSUE ONCE
Status: COMPLETED | OUTPATIENT
Start: 2023-01-24 | End: 2023-01-24

## 2023-01-24 RX ORDER — LIDOCAINE HYDROCHLORIDE 10 MG/ML
10 INJECTION, SOLUTION EPIDURAL; INFILTRATION; INTRACAUDAL; PERINEURAL ONCE
Status: COMPLETED | OUTPATIENT
Start: 2023-01-24 | End: 2023-01-24

## 2023-01-24 RX ADMIN — LIDOCAINE HYDROCHLORIDE 10 MG: 10 INJECTION, SOLUTION EPIDURAL; INFILTRATION; INTRACAUDAL; PERINEURAL at 16:22

## 2023-01-24 RX ADMIN — BETAMETHASONE SODIUM PHOSPHATE AND BETAMETHASONE ACETATE 6 MG: 3; 3 INJECTION, SUSPENSION INTRA-ARTICULAR; INTRALESIONAL; INTRAMUSCULAR; SOFT TISSUE at 16:22

## 2023-01-24 NOTE — PROGRESS NOTES
[facility]) Physicians  Neurosurgery and Pain [address] [facility], [place] [address]: [phone]  F: [phone]      Lumbar Radio Frequency Ablation     Provider: Giselle Mclean DO          Patient Name: Pia Park : 1960        Date: 2023      Pia Park is here today for interventional pain management. Standard ASIPP guidelines were followed and sterile technique used. Area was cleaned with Betadine x3. Informed consent was obtained. Fluoroscopic guidance was used for this procedure. Multiple views of fluoroscopy were used during procedure to assist with needle placement. Appropriate sized RF 10mm active tip needle was used and advance to appropriate anatomic location. There was appropriate multifidus contraction noted with motor stimulation at 2 Hz between 0.5-1.5 volts. No limb or gluteal contraction was noted taking it up to 3.5 volts. Prior to lesioning at 80 degrees Celsius for 90 seconds, approximately 0.75mg/1mg of Celestone and ½ cc of 1% preservative free Lidocaine was injected. Impedance was between 200-500 ohms during the procedure. Patient tolerated the procedure well, no obvious complications occurred during the procedure. Patient was appropriately monitored and discharged home in stable condition with their usual motor strength. Post Op instructions were given to patient.           [] Bilateral [] T11 [] L1 [] S1     [] T12 [] L2 [] S2    [] Right  [x] L3 [] S3      [x] L4 [] S4    [x] Left  [x] L5                              Giselle Mclean DO

## 2023-02-09 RX ORDER — METHYLPREDNISOLONE 4 MG/1
TABLET ORAL
Qty: 1 KIT | Refills: 0 | Status: SHIPPED | OUTPATIENT
Start: 2023-02-09 | End: 2023-02-15

## 2023-02-23 ENCOUNTER — OFFICE VISIT (OUTPATIENT)
Dept: PAIN MANAGEMENT | Age: 63
End: 2023-02-23
Payer: COMMERCIAL

## 2023-02-23 VITALS
DIASTOLIC BLOOD PRESSURE: 84 MMHG | BODY MASS INDEX: 39.87 KG/M2 | TEMPERATURE: 97.3 F | HEIGHT: 63 IN | WEIGHT: 225 LBS | SYSTOLIC BLOOD PRESSURE: 134 MMHG

## 2023-02-23 DIAGNOSIS — M54.12 CERVICAL RADICULOPATHY: ICD-10-CM

## 2023-02-23 DIAGNOSIS — M51.36 DDD (DEGENERATIVE DISC DISEASE), LUMBAR: ICD-10-CM

## 2023-02-23 DIAGNOSIS — M47.817 LUMBOSACRAL SPONDYLOSIS WITHOUT MYELOPATHY: Primary | ICD-10-CM

## 2023-02-23 PROCEDURE — 99213 OFFICE O/P EST LOW 20 MIN: CPT | Performed by: PAIN MEDICINE

## 2023-02-23 RX ORDER — GABAPENTIN 300 MG/1
300 CAPSULE ORAL 3 TIMES DAILY PRN
Qty: 90 CAPSULE | Refills: 0 | Status: SHIPPED | OUTPATIENT
Start: 2023-02-23 | End: 2023-03-25

## 2023-02-23 RX ORDER — TIZANIDINE 4 MG/1
4 TABLET ORAL 2 TIMES DAILY PRN
Qty: 60 TABLET | Refills: 0 | Status: SHIPPED | OUTPATIENT
Start: 2023-02-23 | End: 2023-03-25

## 2023-02-23 NOTE — PROGRESS NOTES
[facility] ([facility]) Physicians  Neurosurgery and Pain [address] [facility], [place] [address]: [phone]  F: [phone]        Juan Diego Ibarra [facility]  (1960)    2/23/2023    Subjective:     Corona Martinez is 58 y.o. female who complains today of:     Chief Complaint   Patient presents with    Back Pain     Patient here today for follow-up. The lumbar RF ablation helped significantly certainly over 72% she is happy with the relief. Unfortunately having a lot of neck pain pain down the arms worse on the left numbness tingling. Dr. Keon Houston wanted to wait longer for another cervical epidural due to the amount of cortisone the patient has received over the last several months. Pain does affect her quality life. Plan: Discussed options in detail. We will start gabapentin 300 mg 3 times a day #90 start at night side effects discussed she has taken this in the remote past after her surgery. Also give her Zanaflex 4 mg twice a day as needed #60 for spasms. She will not take both medications at the same time initially to make sure she does not have side effects from one of the other. She will call me in 1 to 2 weeks let me know how she is doing. It is possible that these medications will help her symptoms enough that she will not need an epidural in the cervical spine for quite some time. She will keep me updated. Questions answered chart was reviewed. She is in agreement with plan.     Allergies:  Adhesive tape, Penicillins, and Venlafaxine    Past Medical History:   Diagnosis Date    Cervical disc herniation 03/22/2018    Cervical radiculopathy 03/19/2018    Cervical spondylitis with radiculitis ([facility] [address] 03/19/2018    Foraminal stenosis of cervical region 03/22/2018    Migraine aura without headache     dizziness, neck pain    Mixed hyperlipidemia 06/23/2022    PONV (postoperative nausea and vomiting)     patient needs anti-emetics to prevent triggering migraine POTS (postural orthostatic tachycardia syndrome)     no symptoms in last 4 years    Right arm numbness 2018    Sleep apnea 2018    On C-Pap     Past Surgical History:   Procedure Laterality Date    CARDIAC CATHETERIZATION      ENDOMETRIAL ABLATION      DC LAMINECTOMY W/O FFD > 2 VERT SEG CERVICAL N/A 3/26/2018    RIGHT C6-7,   RIGHT C7-T1, LAMINECTOMY, FORAMINOTOMY. performed by Hershall Denver, MD at [address] History   Problem Relation Age of Onset    Arthritis Mother     High Blood Pressure Mother     Heart Attack Father     Diabetes Father     Diabetes Sister     No Known Problems Daughter     Breast Cancer Paternal Grandmother      Social History     Socioeconomic History    Marital status:       Spouse name: Not on file    Number of children: Not on file    Years of education: Not on file    Highest education level: Not on file   Occupational History    Not on file   Tobacco Use    Smoking status: Former     Packs/day: 0.50     Years: 15.00     Pack years: 7.50     Types: Cigarettes     Quit date: 12     Years since quittin.1    Smokeless tobacco: Never   Substance and Sexual Activity    Alcohol use: Yes     Comment: rarely    Drug use: No    Sexual activity: Never   Other Topics Concern    Not on file   Social History Narrative    Not on file     Social Determinants of Health     Financial Resource Strain: Not on file   Food Insecurity: Not on file   Transportation Needs: Not on file   Physical Activity: Not on file   Stress: Not on file   Social Connections: Not on file   Intimate Partner Violence: Not on file   Housing Stability: Not on file       Current Outpatient Medications on File Prior to Visit   Medication Sig Dispense Refill    predniSONE (DELTASONE) 10 MG tablet 4 tabs for 3 days, then 3 tabs for 3 days, then 2 tabs for 3 days, then 1 tab for 3 days, then DC. 30 tablet 0    predniSONE (DELTASONE) 10 MG tablet Take 1 tablet by mouth daily 1 tab TID x 3 days, 1 tab BID x 3 days, 1 tab QD x 3 days 18 tablet 0    predniSONE (DELTASONE) 10 MG tablet Take 1 tablet by mouth daily 1 tab TID x 3 days, 1 tab BID x 3 days, 1 tab QD x 3 days 18 tablet 0    gabapentin (NEURONTIN) 300 MG capsule Take 1 capsule by mouth 2 times daily for 30 days. . 60 capsule 1    predniSONE (DELTASONE) 10 MG tablet Take 1 tablet by mouth daily 1 tab TID x 3 days, 1 tab BID x 3 days, 1 tab QD x 3 days 18 tablet 0    buPROPion (WELLBUTRIN XL) 150 MG extended release tablet Take 150 mg by mouth every morning      omeprazole (PRILOSEC) 40 MG delayed release capsule Take 40 mg by mouth daily      diazepam (VALIUM) 10 MG tablet Take 10 mg by mouth nightly as needed for Sleep.      ibuprofen (ADVIL;MOTRIN) 800 MG tablet Take 800 mg by mouth every 6 hours as needed for Pain      Cholecalciferol (VITAMIN D3) 5000 units TABS Take 5,000 Units by mouth daily      PREDNISONE PO Take by mouth daily 4 days 40mg, 4 days 30mg, 4 days 20mg, 4 days 10mg,      PREDNISONE, STANLEY, PO Take 30 mg by mouth Started at 40 mg with taper to 10 mg.      aspirin 81 MG tablet Take 81 mg by mouth      metoprolol succinate (TOPROL XL) 50 MG extended release tablet TAKE ONE AND ONE-HALF TABLETS DAILY       Current Facility-Administered Medications on File Prior to Visit   Medication Dose Route Frequency Provider Last Rate Last Admin    iopamidol (ISOVUE-300) 61 % injection 1 mL  1 mL Other ONCE PRN Ismael Sewell MD               HPI    Review of Systems   All other systems reviewed and are negative. Objective:     Vitals:  LMP  (LMP Unknown)        Physical Exam    Patient alert and oriented times three, recent and remote memory intact, mood and affect normal, judgement and insight normal. Strength functional for ambulation. Balance and coordinational functional. Visualized skin intact. No visualized cyanosis, pulses intact. Cranial nerves 2-12 grossly intact. No obvious upper motor neuron signs seen.  Sensation intact to light touch. Decreased range of motion cervical spine. Assessment:      Diagnosis Orders   1. Lumbosacral spondylosis without myelopathy        2. DDD (degenerative disc disease), lumbar        3.  Cervical radiculopathy            Plan:

## 2023-03-09 ENCOUNTER — TRANSCRIBE ORDERS (OUTPATIENT)
Dept: ADMINISTRATIVE | Age: 63
End: 2023-03-09

## 2023-03-09 DIAGNOSIS — Z78.0 POSTMENOPAUSAL: Primary | ICD-10-CM

## 2023-03-14 DIAGNOSIS — M54.12 CERVICAL RADICULOPATHY: Primary | ICD-10-CM

## 2023-03-16 ENCOUNTER — HOSPITAL ENCOUNTER (OUTPATIENT)
Dept: WOMENS IMAGING | Age: 63
Discharge: HOME | End: 2023-03-18
Payer: COMMERCIAL

## 2023-03-16 DIAGNOSIS — Z78.0 POSTMENOPAUSAL: ICD-10-CM

## 2023-03-16 PROCEDURE — 77080 DXA BONE DENSITY AXIAL: CPT

## 2023-03-30 ENCOUNTER — TELEPHONE (OUTPATIENT)
Dept: PAIN MANAGEMENT | Age: 63
End: 2023-03-30

## 2023-03-30 NOTE — TELEPHONE ENCOUNTER
ELEONORA TELLO/ DR Jaylene Vazquez    NO AUTH REQUIRED    OK to schedule procedure approved as above. Please note sides/levels approved and date range. (If applicable, sides/levels approved may differ from those ordered)    TO BE SCHEDULED WITH DR. Jaylene Vazquez

## 2023-04-19 ENCOUNTER — TELEPHONE (OUTPATIENT)
Dept: PAIN MANAGEMENT | Age: 63
End: 2023-04-19

## 2023-04-19 ENCOUNTER — PROCEDURE VISIT (OUTPATIENT)
Dept: PAIN MANAGEMENT | Age: 63
End: 2023-04-19

## 2023-04-19 DIAGNOSIS — M54.12 CERVICAL RADICULOPATHY: Primary | ICD-10-CM

## 2023-04-19 RX ORDER — DEXAMETHASONE SODIUM PHOSPHATE 10 MG/ML
10 INJECTION, SOLUTION INTRAMUSCULAR; INTRAVENOUS ONCE
Status: SHIPPED | OUTPATIENT
Start: 2023-04-19

## 2023-04-19 NOTE — PROGRESS NOTES
Cervical Interlaminar Epidural Corticosteroid Injection (ILESI) under Fluoroscopic Guidance         Patient Name: Charlie Pineda  : 1960  Date: 2023   Provider: Sonia Rojo MD    PROCEDURE:  Cervical interlaminar epidural corticosteroid injection (ILESI) at the C7/T1 level under fluoroscopic guidance    INDICATIONS: Charlie Pineda is a 58 y.o. female who presents with symptoms and physical exam findings consistent with cervical radiculopathy. A focused physical exam demonstrates left C6 paresthesias with a positive Spurling's maneuver. She confirms a history of anterior spine surgery C5-7. She has had persistent pain that limits her activities of daily living such as upper body dressing. The pain is persistent despite conservative measures including home exercise program, physical therapy, and anti-inflammatories. She has had greater than 50% pain relief for over 5 months from prior epidural injections. Given her symptoms, physical exam findings, impairment in activities of daily living, and lack of response to conservative measures, consideration for C7/T1 Cervical interlaminar epidural corticosteroid injection under fluoroscopic guidance was given. Discussed the risks including but not limited to bleeding, infection, worsened pain, damage to surrounding structures, side effects, toxicity, allergic reactions to medications used, need for surgery, headache, vision changes, difficulty with chewing and/or swallowing, pneumothorax, immune and stress-response dysfunction, fat necrosis, avascular necrosis, skin pigmentation changes, blood sugar elevation, as well as catastrophic injury such as vision loss/blindness, paralysis, spinal cord or plexus injury, cerebral brainstem or spinal cord infarction, intrathecal injection, spinal cord puncture, persistent dural leak, arachnoiditis, stroke, bowel/bladder/sexual dysfunction, ventilator dependence, loss of use of the arms and/or legs, and death.  Discussed

## 2023-04-19 NOTE — TELEPHONE ENCOUNTER
Patient was seen today by Dr. Kailey Alvarez and had C7-T1 Cervical Interlaminar Epidural Injection. Patient has been taking Gabapentin 2-3 day and if asking if she should still continue with taking it after today's injection. Dr. Kailey Alvarez advised patient that he would recommend continuing it, but wants patient to follow up with Dr. Estrella Gómez. Patient asked to send a message to Dr. Estrella Gómez due to her not having a office visit at this time. Would Dr. Estrella Gómez like patient to continue taking Gabapentin after Cervical Epidural Interlaminar injection?

## 2023-04-20 RX ORDER — SODIUM CHLORIDE 9 MG/ML
2.5 INJECTION INTRAVENOUS ONCE
Status: COMPLETED | OUTPATIENT
Start: 2023-04-20 | End: 2023-04-20

## 2023-04-20 RX ORDER — LIDOCAINE HYDROCHLORIDE 10 MG/ML
5 INJECTION, SOLUTION INFILTRATION; PERINEURAL ONCE
Status: COMPLETED | OUTPATIENT
Start: 2023-04-20 | End: 2023-04-20

## 2023-04-20 RX ADMIN — LIDOCAINE HYDROCHLORIDE 5 ML: 10 INJECTION, SOLUTION INFILTRATION; PERINEURAL at 07:48

## 2023-04-20 RX ADMIN — SODIUM CHLORIDE 2.5 ML: 9 INJECTION INTRAVENOUS at 07:50

## 2023-04-20 RX ADMIN — Medication 0.5 MEQ: at 07:49

## 2023-12-26 ENCOUNTER — HOSPITAL ENCOUNTER (OUTPATIENT)
Dept: WOMENS IMAGING | Age: 63
Discharge: HOME | End: 2023-12-28
Payer: COMMERCIAL

## 2023-12-26 VITALS — BODY MASS INDEX: 39.87 KG/M2 | HEIGHT: 63 IN

## 2023-12-26 DIAGNOSIS — Z12.31 ENCOUNTER FOR SCREENING MAMMOGRAM FOR MALIGNANT NEOPLASM OF BREAST: ICD-10-CM

## 2023-12-26 PROCEDURE — 77063 BREAST TOMOSYNTHESIS BI: CPT

## 2024-05-20 ENCOUNTER — OFFICE VISIT (OUTPATIENT)
Dept: PAIN MANAGEMENT | Age: 64
End: 2024-05-20
Payer: COMMERCIAL

## 2024-05-20 VITALS
BODY MASS INDEX: 38.8 KG/M2 | HEIGHT: 63 IN | DIASTOLIC BLOOD PRESSURE: 80 MMHG | SYSTOLIC BLOOD PRESSURE: 124 MMHG | TEMPERATURE: 97 F | WEIGHT: 219 LBS

## 2024-05-20 DIAGNOSIS — M47.817 LUMBOSACRAL SPONDYLOSIS WITHOUT MYELOPATHY: Primary | ICD-10-CM

## 2024-05-20 PROCEDURE — 1036F TOBACCO NON-USER: CPT | Performed by: NURSE PRACTITIONER

## 2024-05-20 PROCEDURE — 1123F ACP DISCUSS/DSCN MKR DOCD: CPT | Performed by: NURSE PRACTITIONER

## 2024-05-20 PROCEDURE — G8417 CALC BMI ABV UP PARAM F/U: HCPCS | Performed by: NURSE PRACTITIONER

## 2024-05-20 PROCEDURE — 99213 OFFICE O/P EST LOW 20 MIN: CPT | Performed by: NURSE PRACTITIONER

## 2024-05-20 PROCEDURE — 1090F PRES/ABSN URINE INCON ASSESS: CPT | Performed by: NURSE PRACTITIONER

## 2024-05-20 PROCEDURE — G8427 DOCREV CUR MEDS BY ELIG CLIN: HCPCS | Performed by: NURSE PRACTITIONER

## 2024-05-20 PROCEDURE — G8399 PT W/DXA RESULTS DOCUMENT: HCPCS | Performed by: NURSE PRACTITIONER

## 2024-05-20 PROCEDURE — 3017F COLORECTAL CA SCREEN DOC REV: CPT | Performed by: NURSE PRACTITIONER

## 2024-05-20 ASSESSMENT — ENCOUNTER SYMPTOMS
GASTROINTESTINAL NEGATIVE: 1
DIARRHEA: 0
EYES NEGATIVE: 1
CONSTIPATION: 0
TROUBLE SWALLOWING: 0
COUGH: 0
SHORTNESS OF BREATH: 0
BACK PAIN: 1

## 2024-05-20 NOTE — PROGRESS NOTES
kg/m² Pain Score:   5      Physical Exam  Vitals and nursing note reviewed.          This is a pleasant female who answers questions appropriately and follows commands.  Pt is alert and oriented x 3.  Recent and remote memory is intact.  Mood and affect, judgement and insight are normal.  No signs of distress, no dyspnea or SOB noted. HEENT: PERRL.  Neck is supple, trachea midline.  No lymphadenopathy noted.   Surgical scar noted neck.  Decreased ROM with flexion and extension of low back.  TTP to lumbar spine on Rt with positive provocative maneuvers noted.  Negative SLR.  Tightness in both hamstrings noted.   Balance and coordination normal.  Strength is functional for ambulation. Cranial nerves II-XII are intact.      Assessment:      Diagnosis Orders   1. Lumbosacral spondylosis without myelopathy  MO DSTR NROLYTC AGNT PARVERTEB FCT SNGL LMBR/SACRAL    MO DSTR NROLYTC AGNT PARVERTEB FCT ADDL LMBR/SACRAL          Plan:          No orders of the defined types were placed in this encounter.      Orders Placed This Encounter   Procedures    MO DSTR NROLYTC AGNT PARVERTEB FCT SNGL LMBR/SACRAL     Rt L3,4,5 RFA with SK     Standing Status:   Future     Standing Expiration Date:   8/18/2024    MO DSTR NROLYTC AGNT PARVERTEB FCT ADDL LMBR/SACRAL     Standing Status:   Future     Standing Expiration Date:   8/18/2024     Discussed options with the patient today. Anatomic model pathology was shown and reviewed with pt.  We will go ahead and order  right L3, L4, L5 RF ablation to treat axial back pain with Dr. Santamaria as pt has had >80% pain relief with diagnostic MBB's and improvement with past RF ablations. Other causes of pain such as tumor and fracture has been ruled out.  Not having lumbar radicular pain.  she has failed conservative treatment in the past. Anatomic model of pathology was shown. Risks and benefits of the procedure were discussed. All questions were answered and patient understands and agrees with the

## 2024-05-30 ENCOUNTER — TELEPHONE (OUTPATIENT)
Dept: PAIN MANAGEMENT | Age: 64
End: 2024-05-30

## 2024-05-30 NOTE — TELEPHONE ENCOUNTER
REFERRAL # [id number]    RIGHT L345 RFA     AUTH FROM 6/3/24-11/30/24    OK to schedule procedure approved as above.   Please note sides/levels approved and date range.   (If applicable, sides/levels approved may differ from those ordered)    [facility]

## 2024-06-10 ENCOUNTER — HOSPITAL ENCOUNTER (OUTPATIENT)
Age: 64
Discharge: HOME | End: 2024-06-10
Payer: COMMERCIAL

## 2024-06-10 VITALS
BODY MASS INDEX: 38.8 KG/M2 | HEIGHT: 63 IN | SYSTOLIC BLOOD PRESSURE: 136 MMHG | WEIGHT: 219 LBS | DIASTOLIC BLOOD PRESSURE: 78 MMHG | HEART RATE: 78 BPM | OXYGEN SATURATION: 95 %

## 2024-06-10 DIAGNOSIS — M54.50 CHRONIC LOW BACK PAIN, UNSPECIFIED BACK PAIN LATERALITY, UNSPECIFIED WHETHER SCIATICA PRESENT: ICD-10-CM

## 2024-06-10 DIAGNOSIS — G89.29 CHRONIC LOW BACK PAIN, UNSPECIFIED BACK PAIN LATERALITY, UNSPECIFIED WHETHER SCIATICA PRESENT: ICD-10-CM

## 2024-06-10 DIAGNOSIS — M47.817 LUMBOSACRAL SPONDYLOSIS WITHOUT MYELOPATHY: Primary | ICD-10-CM

## 2024-06-10 DIAGNOSIS — M54.12 CERVICAL RADICULOPATHY: Primary | ICD-10-CM

## 2024-06-10 PROCEDURE — 77003 FLUOROGUIDE FOR SPINE INJECT: CPT

## 2024-06-10 PROCEDURE — 6360000002 HC RX W HCPCS: Performed by: PAIN MEDICINE

## 2024-06-10 PROCEDURE — 64635 DESTROY LUMB/SAC FACET JNT: CPT | Performed by: PAIN MEDICINE

## 2024-06-10 PROCEDURE — 64636 DESTROY L/S FACET JNT ADDL: CPT | Performed by: PAIN MEDICINE

## 2024-06-10 PROCEDURE — 99213 OFFICE O/P EST LOW 20 MIN: CPT | Performed by: PAIN MEDICINE

## 2024-06-10 PROCEDURE — 2500000003 HC RX 250 WO HCPCS: Performed by: PAIN MEDICINE

## 2024-06-10 RX ORDER — LIDOCAINE HYDROCHLORIDE 10 MG/ML
10 INJECTION, SOLUTION EPIDURAL; INFILTRATION; INTRACAUDAL; PERINEURAL ONCE
Status: COMPLETED | OUTPATIENT
Start: 2024-06-10 | End: 2024-06-10

## 2024-06-10 RX ORDER — BETAMETHASONE SODIUM PHOSPHATE AND BETAMETHASONE ACETATE 3; 3 MG/ML; MG/ML
6 INJECTION, SUSPENSION INTRA-ARTICULAR; INTRALESIONAL; INTRAMUSCULAR; SOFT TISSUE ONCE
Status: COMPLETED | OUTPATIENT
Start: 2024-06-10 | End: 2024-06-10

## 2024-06-10 RX ADMIN — BETAMETHASONE ACETATE AND BETAMETHASONE SODIUM PHOSPHATE 6 MG: 3; 3 INJECTION, SUSPENSION INTRA-ARTICULAR; INTRALESIONAL; INTRAMUSCULAR; SOFT TISSUE at 08:20

## 2024-06-10 RX ADMIN — LIDOCAINE HYDROCHLORIDE 10 MG: 10 INJECTION, SOLUTION EPIDURAL; INFILTRATION; INTRACAUDAL; PERINEURAL at 08:20

## 2024-06-10 NOTE — PROGRESS NOTES
[facility]  Neurosurgery and Pain Management Center  [address], [address]  [place], [place]  P: [phone]  F: [phone]      Lumbar Radio Frequency Ablation     Provider: TAD OSBORNE DO          Patient Name: Marcela Jay : 1960        Date: 6/10/2024      Marcela Jay is here today for interventional pain management.  Standard ASI guidelines were followed and sterile technique used.  Area was cleaned with Betadine x3.  Informed consent was obtained.  Fluoroscopic guidance was used for this procedure. Multiple views of fluoroscopy were used during procedure to assist with needle placement. Appropriate sized RF 10mm active tip needle was used and advance to appropriate anatomic location.    There was appropriate multifidus contraction noted with motor stimulation at 2 Hz between 0.5-1.5 volts. No limb or gluteal contraction was noted taking it up to 3.5 volts. Prior to lesioning at 80 degrees Celsius for 90 seconds, approximately 0.75mg/1mg of Celestone and ½ cc of 1% preservative free Lidocaine was injected. Impedance was between 200-500 ohms during the procedure.     Patient tolerated the procedure well, no obvious complications occurred during the procedure.  Patient was appropriately monitored and discharged home in stable condition with their usual motor strength. Post Op instructions were given to patient.          [] Bilateral [] T11 [] L1 [] S1     [] T12 [] L2 [] S2    [x] Right  [x] L3 [] S3      [x] L4 [] S4    [] Left  [x] L5                              TAD OSBORNE DO      
injection 1 mL  1 mL Other ONCE PRN Cris Mariano MD        dexamethasone (PF) (DECADRON) injection 10 mg  10 mg Other Once Cris Mariano MD        iopamidol (ISOVUE-300) 61 % injection 1 mL  1 mL Other ONCE PRN Cris Mariano MD               HPI    Review of Systems   All other systems reviewed and are negative.        Objective:     Vitals:  /78   Pulse 78   Ht 1.6 m (5' 3\")   Wt 99.3 kg (219 lb)   LMP  (LMP Unknown)   SpO2 95%   BMI 38.79 kg/m²        Physical Exam  Patient alert and oriented times three, recent and remote memory intact, mood and affect normal, judgement and insight normal. Strength functional for ambulation. Balance and coordinational functional. Visualized skin intact. No visualized cyanosis, pulses intact. Cranial nerves 2-12 grossly intact. No obvious upper motor neuron signs seen. Sensation intact to light touch.  Positive Spurling's maneuver right    Assessment:     Hospital Problems             Last Modified POA    Cervical radiculopathy (Chronic) 6/10/2024 Yes       Plan:

## 2024-06-17 ENCOUNTER — TELEPHONE (OUTPATIENT)
Dept: PAIN MANAGEMENT | Age: 64
End: 2024-06-17

## 2024-06-17 NOTE — TELEPHONE ENCOUNTER
REFERRAL # [id number]    C7-T1 ANA     AUTH FROM 6/17/24-12/14/24    OK to schedule procedure approved as above.   Please note sides/levels approved and date range.   (If applicable, sides/levels approved may differ from those ordered)    TO BE SCHEDULED WITH DR OSBORNE

## 2024-06-27 ENCOUNTER — PROCEDURE VISIT (OUTPATIENT)
Age: 64
End: 2024-06-27
Payer: COMMERCIAL

## 2024-06-27 VITALS
WEIGHT: 217 LBS | SYSTOLIC BLOOD PRESSURE: 132 MMHG | TEMPERATURE: 97.6 F | BODY MASS INDEX: 38.45 KG/M2 | HEART RATE: 70 BPM | HEIGHT: 63 IN | OXYGEN SATURATION: 96 % | DIASTOLIC BLOOD PRESSURE: 78 MMHG

## 2024-06-27 DIAGNOSIS — M54.12 CERVICAL RADICULOPATHY: Primary | ICD-10-CM

## 2024-06-27 PROCEDURE — 62321 NJX INTERLAMINAR CRV/THRC: CPT | Performed by: PHYSICAL MEDICINE & REHABILITATION

## 2024-06-27 RX ORDER — LIDOCAINE HYDROCHLORIDE 10 MG/ML
2 INJECTION, SOLUTION EPIDURAL; INFILTRATION; INTRACAUDAL; PERINEURAL ONCE
Status: COMPLETED | OUTPATIENT
Start: 2024-06-27 | End: 2024-06-27

## 2024-06-27 RX ORDER — DEXAMETHASONE SODIUM PHOSPHATE 10 MG/ML
10 INJECTION, SOLUTION INTRAMUSCULAR; INTRAVENOUS ONCE
Status: COMPLETED | OUTPATIENT
Start: 2024-06-27 | End: 2024-06-27

## 2024-06-27 RX ORDER — DEXAMETHASONE SODIUM PHOSPHATE 10 MG/ML
10 INJECTION, EMULSION INTRAMUSCULAR; INTRAVENOUS ONCE
Status: DISCONTINUED | OUTPATIENT
Start: 2024-06-27 | End: 2024-06-27

## 2024-06-27 RX ORDER — SODIUM CHLORIDE 9 MG/ML
3 INJECTION, SOLUTION INTRAMUSCULAR; INTRAVENOUS; SUBCUTANEOUS ONCE
Status: COMPLETED | OUTPATIENT
Start: 2024-06-27 | End: 2024-06-27

## 2024-06-27 RX ADMIN — Medication 1 MEQ: at 09:06

## 2024-06-27 RX ADMIN — DEXAMETHASONE SODIUM PHOSPHATE 10 MG: 10 INJECTION, SOLUTION INTRAMUSCULAR; INTRAVENOUS at 09:03

## 2024-06-27 RX ADMIN — SODIUM CHLORIDE 3 ML: 9 INJECTION, SOLUTION INTRAMUSCULAR; INTRAVENOUS; SUBCUTANEOUS at 09:06

## 2024-06-27 RX ADMIN — LIDOCAINE HYDROCHLORIDE 2 ML: 10 INJECTION, SOLUTION EPIDURAL; INFILTRATION; INTRACAUDAL; PERINEURAL at 09:05

## 2024-06-27 NOTE — PROGRESS NOTES
appropriately dressed.     The patient tolerated the procedure well. There were no immediate post procedure complications. She was monitored in the recovery room post procedure. She was at her baseline neuromuscular examination prior to being discharged home in stable condition. Post procedure instructions were given. The patient will follow-up as previously instructed. She will resume anticoagulation tomorrow.              [address], [address], [place] [address]  Phone [phone]/Fax [phone]

## 2024-10-08 ENCOUNTER — OFFICE VISIT (OUTPATIENT)
Age: 64
End: 2024-10-08
Payer: COMMERCIAL

## 2024-10-08 VITALS
DIASTOLIC BLOOD PRESSURE: 76 MMHG | WEIGHT: 217 LBS | SYSTOLIC BLOOD PRESSURE: 136 MMHG | TEMPERATURE: 96.8 F | BODY MASS INDEX: 37.05 KG/M2 | HEIGHT: 64 IN

## 2024-10-08 DIAGNOSIS — M46.1 SACROILIITIS (HCC): Primary | ICD-10-CM

## 2024-10-08 PROCEDURE — 1123F ACP DISCUSS/DSCN MKR DOCD: CPT | Performed by: PAIN MEDICINE

## 2024-10-08 PROCEDURE — 99213 OFFICE O/P EST LOW 20 MIN: CPT | Performed by: PAIN MEDICINE

## 2024-10-08 NOTE — PROGRESS NOTES
[facility]icians  Neurosurgery and Pain Management Center  P: [phone]  F: [phone]        Marcela Jay  (1960)    10/8/2024    Subjective:     Marcela Jay is 64 y.o. female who complains today of:     Chief Complaint   Patient presents with    Back Pain     Right side lower back    Patient here today for follow-up.  She is having right-sided upper buttock low back pain.  Morning time can be rough.  But she had a previous recent ablation did not help much her neck and arm pain is much better since the cervical epidural.  She takes Tylenol Motrin as needed.  She tries not to take Motrin take Tylenol couple times a week.  He did travel to [place] recently.  Pain can affect her quality life achy sharp pain that varies in intensity.  No numbness down the right leg.  No new weakness in the legs.  I reviewed her MRI report from several years ago.    Assessment: Sacroiliitis, lumbar spondylosis    Plan: We discussed options in detail.  Patient has flared up pain in the sacroiliac joint. Patient has failed conservative treatment including physical therapy/home exercise program. I would like to authorize right S.I. Joint injection under fluoroscopic guidance. NRS pain level is 6 out of 10. Anatomic model of pathology was shown. Risks and benefits of the procedure were discussed. All questions were answered and patient understands and agrees with the plan.      Allergies:  Adhesive tape, Penicillins, and Venlafaxine    Past Medical History:   Diagnosis Date    Cervical disc herniation 03/22/2018    Cervical radiculopathy 03/19/2018    Cervical spondylitis with radiculitis (HCC) 03/19/2018    Foraminal stenosis of cervical region 03/22/2018    Migraine aura without headache     dizziness, neck pain    Mixed hyperlipidemia 06/23/2022    PONV (postoperative nausea and vomiting)     patient needs anti-emetics to prevent triggering migraine    POTS (postural orthostatic tachycardia

## 2024-10-09 ENCOUNTER — TELEPHONE (OUTPATIENT)
Age: 64
End: 2024-10-09

## 2024-10-09 NOTE — TELEPHONE ENCOUNTER
RIGHT SI JOINT INJECTION  NO AUTH REQUIRED      OK to schedule procedure approved as above.   Please note sides/levels approved and date range.   (If applicable, sides/levels approved may differ from those ordered)       TO BE SCHEDULED WITH DR. OSBORNE

## 2024-10-28 RX ORDER — METHYLPREDNISOLONE 4 MG/1
TABLET ORAL
Qty: 1 KIT | Refills: 0 | Status: SHIPPED | OUTPATIENT
Start: 2024-10-28 | End: 2024-10-28 | Stop reason: SDUPTHER

## 2024-10-28 RX ORDER — METHYLPREDNISOLONE 4 MG/1
TABLET ORAL
Qty: 1 KIT | Refills: 0 | Status: SHIPPED | OUTPATIENT
Start: 2024-10-28 | End: 2024-11-03

## 2024-11-18 ENCOUNTER — PROCEDURE VISIT (OUTPATIENT)
Age: 64
End: 2024-11-18
Payer: COMMERCIAL

## 2024-11-18 VITALS
HEART RATE: 76 BPM | TEMPERATURE: 97 F | DIASTOLIC BLOOD PRESSURE: 80 MMHG | HEIGHT: 63 IN | SYSTOLIC BLOOD PRESSURE: 138 MMHG | OXYGEN SATURATION: 98 % | BODY MASS INDEX: 38.45 KG/M2 | WEIGHT: 217 LBS

## 2024-11-18 DIAGNOSIS — M46.1 SACROILIITIS (HCC): Primary | ICD-10-CM

## 2024-11-18 PROCEDURE — 77002 NEEDLE LOCALIZATION BY XRAY: CPT | Performed by: PAIN MEDICINE

## 2024-11-18 PROCEDURE — 27096 INJECT SACROILIAC JOINT: CPT | Performed by: PAIN MEDICINE

## 2024-11-18 PROCEDURE — G0260 INJ FOR SACROILIAC JT ANESTH: HCPCS | Performed by: PAIN MEDICINE

## 2024-11-18 RX ORDER — LIDOCAINE HYDROCHLORIDE 10 MG/ML
3 INJECTION, SOLUTION EPIDURAL; INFILTRATION; INTRACAUDAL; PERINEURAL ONCE
Status: COMPLETED | OUTPATIENT
Start: 2024-11-18 | End: 2024-11-18

## 2024-11-18 RX ORDER — BETAMETHASONE SODIUM PHOSPHATE AND BETAMETHASONE ACETATE 3; 3 MG/ML; MG/ML
6 INJECTION, SUSPENSION INTRA-ARTICULAR; INTRALESIONAL; INTRAMUSCULAR; SOFT TISSUE ONCE
Status: COMPLETED | OUTPATIENT
Start: 2024-11-18 | End: 2024-11-18

## 2024-11-18 RX ADMIN — LIDOCAINE HYDROCHLORIDE 3 ML: 10 INJECTION, SOLUTION EPIDURAL; INFILTRATION; INTRACAUDAL; PERINEURAL at 09:13

## 2024-11-18 RX ADMIN — BETAMETHASONE SODIUM PHOSPHATE AND BETAMETHASONE ACETATE 6 MG: 3; 3 INJECTION, SUSPENSION INTRA-ARTICULAR; INTRALESIONAL; INTRAMUSCULAR at 09:13

## 2024-11-18 ASSESSMENT — PAIN SCALES - GENERAL: PAINLEVEL_OUTOF10: 4

## 2024-11-18 ASSESSMENT — PAIN DESCRIPTION - LOCATION: LOCATION: BACK

## 2024-11-18 NOTE — PROGRESS NOTES
[facility]  Neurosurgery and Pain Management Center  [address], [address]  [place], [place]  P: [phone]  F: [phone]      Patient Name: Marcela Jay  : 1960     Date:  2024      Physician: TAD OSBORNE DO        Marcela Jay is here today for interventional pain management.    Preoperatively, the patient presents with SI joint mediated pain, as per history and exam. Patient has failed NSAIDs, PT, and conservative treatment. Patient has significant psychological and functional impairment due to this condition.  Standard ASIPP guidelines were followed and sterile technique used.  Area was cleaned with Betadine x3.  Informed consent was obtained.  Fluoroscopic guidance was used for this procedure.    S.I. JOINT:  Right  Appropriate length spinal needle was advanced to the S.I. Joint.  Negative aspiration was achieved. In total, approximately 6mg of Betamethasone and 2ml of 1% preservative free Lidocaine was injected without difficulty.This procedure was 35% more difficult and required 35% more work secondary to the patient's habitus. The patient has a BMI of 38 and has comorbidities of hypertension and obesity. This required increased work for safe and proper positioning upon the fluoroscopy table, increased needle passes for safe and appropriate needle placement, and increased fluoroscopy time and radiation exposure for proper visualization.        Patient tolerated the procedure well, no obvious complications occurred during the procedure.  Patient was appropriately monitored and discharged home in stable condition with their usual motor strength. Post Op Instructions were given to patient. Relevant and recent imaging reviewed with patient today.                                      TAD OSBORNE DO

## 2024-12-27 ENCOUNTER — HOSPITAL ENCOUNTER (OUTPATIENT)
Dept: WOMENS IMAGING | Age: 64
Discharge: HOME | End: 2024-12-29
Payer: COMMERCIAL

## 2024-12-27 VITALS — HEIGHT: 63 IN | BODY MASS INDEX: 38.45 KG/M2

## 2024-12-27 DIAGNOSIS — Z12.31 ENCOUNTER FOR SCREENING MAMMOGRAM FOR MALIGNANT NEOPLASM OF BREAST: ICD-10-CM

## 2024-12-27 PROCEDURE — 77063 BREAST TOMOSYNTHESIS BI: CPT

## 2025-01-22 RX ORDER — METHYLPREDNISOLONE 4 MG/1
TABLET ORAL
Qty: 1 KIT | Refills: 0 | Status: SHIPPED | OUTPATIENT
Start: 2025-01-22 | End: 2025-01-28

## 2025-01-27 RX ORDER — PREDNISONE 10 MG/1
10 TABLET ORAL DAILY
Qty: 18 TABLET | Refills: 0 | Status: SHIPPED | OUTPATIENT
Start: 2025-01-27

## 2025-02-12 ENCOUNTER — OFFICE VISIT (OUTPATIENT)
Age: 65
End: 2025-02-12
Payer: COMMERCIAL

## 2025-02-12 VITALS
SYSTOLIC BLOOD PRESSURE: 118 MMHG | HEIGHT: 63 IN | TEMPERATURE: 97.3 F | DIASTOLIC BLOOD PRESSURE: 64 MMHG | BODY MASS INDEX: 38.45 KG/M2 | WEIGHT: 217 LBS

## 2025-02-12 DIAGNOSIS — M48.02 FORAMINAL STENOSIS OF CERVICAL REGION: ICD-10-CM

## 2025-02-12 DIAGNOSIS — M54.12 CERVICAL RADICULOPATHY: Primary | ICD-10-CM

## 2025-02-12 PROCEDURE — 99214 OFFICE O/P EST MOD 30 MIN: CPT | Performed by: NURSE PRACTITIONER

## 2025-02-12 PROCEDURE — 1125F AMNT PAIN NOTED PAIN PRSNT: CPT | Performed by: NURSE PRACTITIONER

## 2025-02-12 PROCEDURE — 1160F RVW MEDS BY RX/DR IN RCRD: CPT | Performed by: NURSE PRACTITIONER

## 2025-02-12 PROCEDURE — 1123F ACP DISCUSS/DSCN MKR DOCD: CPT | Performed by: NURSE PRACTITIONER

## 2025-02-12 PROCEDURE — 1159F MED LIST DOCD IN RCRD: CPT | Performed by: NURSE PRACTITIONER

## 2025-02-12 PROCEDURE — 99215 OFFICE O/P EST HI 40 MIN: CPT | Performed by: NURSE PRACTITIONER

## 2025-02-12 ASSESSMENT — ENCOUNTER SYMPTOMS
GASTROINTESTINAL NEGATIVE: 1
TROUBLE SWALLOWING: 0
DIARRHEA: 0
SHORTNESS OF BREATH: 0
EYES NEGATIVE: 1
BACK PAIN: 1
COUGH: 0
CONSTIPATION: 0

## 2025-02-12 NOTE — PROGRESS NOTES
Marcela Jay  (1960)    2025    Subjective:     Marcela Jay is 65 y.o. female who complains today of:    Chief Complaint   Patient presents with    Follow-up    Arm Pain     Forearm     Hand Pain     phalanges         Allergies:  Adhesive tape, Penicillins, and Venlafaxine    Past Medical History:   Diagnosis Date    Cervical disc herniation 2018    Cervical radiculopathy 2018    Cervical spondylitis with radiculitis (HCC) 2018    Foraminal stenosis of cervical region 2018    Migraine aura without headache     dizziness, neck pain    Mixed hyperlipidemia 2022    PONV (postoperative nausea and vomiting)     patient needs anti-emetics to prevent triggering migraine    POTS (postural orthostatic tachycardia syndrome)     no symptoms in last 4 years    Right arm numbness 2018    Sleep apnea 2018    On C-Pap     Past Surgical History:   Procedure Laterality Date    CARDIAC CATHETERIZATION      ENDOMETRIAL ABLATION      DE LAMINECTOMY W/O FFD > 2 VERT SEG CERVICAL N/A 3/26/2018    RIGHT C6-7,   RIGHT C7-T1, LAMINECTOMY, FORAMINOTOMY. performed by Chacorta Adler MD at [facility]     Family History   Problem Relation Age of Onset    Arthritis Mother     High Blood Pressure Mother     Heart Attack Father     Diabetes Father     Diabetes Sister     No Known Problems Daughter     Left Breast Cancer Paternal Grandmother     Breast Cancer Paternal Grandmother         in her 70's     Social History     Socioeconomic History    Marital status:      Spouse name: Not on file    Number of children: Not on file    Years of education: Not on file    Highest education level: Not on file   Occupational History    Not on file   Tobacco Use    Smoking status: Former     Current packs/day: 0.00     Average packs/day: 0.5 packs/day for 15.0 years (7.5 ttl pk-yrs)     Types: Cigarettes     Start date:      Quit date:      Years since quittin.1    Smokeless

## 2025-02-13 ENCOUNTER — TELEPHONE (OUTPATIENT)
Age: 65
End: 2025-02-13

## 2025-02-13 NOTE — TELEPHONE ENCOUNTER
Called patient and scheduled RIGHT C7-T1 ILESI with Dr. Mariano on 02/18/2025 @ 2:00 PM     AUTH FROM 2/13/25-4/30/25

## 2025-02-13 NOTE — TELEPHONE ENCOUNTER
REFERRAL # [id number]    RIGHT C7-T1 ILESI     AUTH FROM 2/13/25-4/30/25    OK to schedule procedure approved as above.   Please note sides/levels approved and date range.   (If applicable, sides/levels approved may differ from those ordered)    TO BE SCHEDULED WITH DR MARY

## 2025-02-18 ENCOUNTER — PROCEDURE VISIT (OUTPATIENT)
Age: 65
End: 2025-02-18
Payer: COMMERCIAL

## 2025-02-18 VITALS
SYSTOLIC BLOOD PRESSURE: 130 MMHG | DIASTOLIC BLOOD PRESSURE: 70 MMHG | TEMPERATURE: 96.8 F | BODY MASS INDEX: 38.45 KG/M2 | HEIGHT: 63 IN | HEART RATE: 80 BPM | WEIGHT: 217 LBS | OXYGEN SATURATION: 96 %

## 2025-02-18 DIAGNOSIS — M54.12 CERVICAL RADICULOPATHY: Primary | ICD-10-CM

## 2025-02-18 PROCEDURE — 62321 NJX INTERLAMINAR CRV/THRC: CPT | Performed by: PHYSICAL MEDICINE & REHABILITATION

## 2025-02-18 RX ORDER — SODIUM CHLORIDE 9 MG/ML
3 INJECTION, SOLUTION INTRAMUSCULAR; INTRAVENOUS; SUBCUTANEOUS ONCE
Status: COMPLETED | OUTPATIENT
Start: 2025-02-18 | End: 2025-02-18

## 2025-02-18 RX ORDER — DEXAMETHASONE SODIUM PHOSPHATE 10 MG/ML
10 INJECTION, SOLUTION INTRAMUSCULAR; INTRAVENOUS ONCE
Status: COMPLETED | OUTPATIENT
Start: 2025-02-18 | End: 2025-02-18

## 2025-02-18 RX ORDER — LIDOCAINE HYDROCHLORIDE 10 MG/ML
2 INJECTION, SOLUTION EPIDURAL; INFILTRATION; INTRACAUDAL; PERINEURAL ONCE
Status: COMPLETED | OUTPATIENT
Start: 2025-02-18 | End: 2025-02-18

## 2025-02-18 RX ADMIN — SODIUM CHLORIDE 3 ML: 9 INJECTION, SOLUTION INTRAMUSCULAR; INTRAVENOUS; SUBCUTANEOUS at 14:54

## 2025-02-18 RX ADMIN — Medication 1 MEQ: at 14:54

## 2025-02-18 RX ADMIN — LIDOCAINE HYDROCHLORIDE 2 ML: 10 INJECTION, SOLUTION EPIDURAL; INFILTRATION; INTRACAUDAL; PERINEURAL at 14:53

## 2025-02-18 RX ADMIN — DEXAMETHASONE SODIUM PHOSPHATE 10 MG: 10 INJECTION, SOLUTION INTRAMUSCULAR; INTRAVENOUS at 14:54

## 2025-02-18 ASSESSMENT — PAIN SCALES - GENERAL
PAINLEVEL_OUTOF10: 2
PAINLEVEL_OUTOF10: 6

## 2025-02-18 ASSESSMENT — PAIN DESCRIPTION - LOCATION: LOCATION: NECK

## 2025-02-18 NOTE — PROGRESS NOTES
difficulty. The needle was flushed and removed. The site was hemostatic. The site was cleaned and appropriately dressed.     The patient tolerated the procedure well. There were no immediate post procedure complications. She was monitored in the recovery room post procedure. She was at her baseline neuromuscular examination prior to being discharged home in stable condition. Post procedure instructions were given. The patient will follow-up as previously instructed. She will resume anticoagulation tomorrow.            [facility], [address], [place] [address]  Phone [phone]/Fax [phone]

## 2025-03-28 ENCOUNTER — TELEPHONE (OUTPATIENT)
Age: 65
End: 2025-03-28

## 2025-03-28 RX ORDER — PREDNISONE 10 MG/1
10 TABLET ORAL DAILY
Qty: 18 TABLET | Refills: 0 | Status: SHIPPED | OUTPATIENT
Start: 2025-03-28

## (undated) DEVICE — 3M™ TEGADERM™ TRANSPARENT FILM DRESSING FRAME STYLE, 1626W, 4 IN X 4-3/4 IN (10 CM X 12 CM), 50/CT 4CT/CASE: Brand: 3M™ TEGADERM™

## (undated) DEVICE — 1842 FOAM BLOCK NEEDLE COUNTER: Brand: DEVON

## (undated) DEVICE — INTENDED FOR TISSUE SEPARATION, AND OTHER PROCEDURES THAT REQUIRE A SHARP SURGICAL BLADE TO PUNCTURE OR CUT.: Brand: BARD-PARKER ® CARBON RIB-BACK BLADES

## (undated) DEVICE — LABEL MED MINI W/ MARKER

## (undated) DEVICE — SKIN MARKER,REGULAR TIP WITH RULER: Brand: DEVON

## (undated) DEVICE — SUTURE VCRL + SZ 3-0 L18IN ABSRB UD PS-2 3/8 CIR REV CUT VCP497H

## (undated) DEVICE — PACK,SET UP,DRAPE: Brand: MEDLINE

## (undated) DEVICE — 3M™ STERI-DRAPE™ INSTRUMENT POUCH 1018: Brand: STERI-DRAPE™

## (undated) DEVICE — PENCIL ES L3M BTTN SWCH HOLSTER W/ BLDE ELECTRD EDGE

## (undated) DEVICE — STERILE LATEX POWDER-FREE SURGICAL GLOVESWITH NITRILE COATING: Brand: PROTEXIS

## (undated) DEVICE — MEDI-VAC NON-CONDUCTIVE SUCTION TUBING: Brand: CARDINAL HEALTH

## (undated) DEVICE — TOWEL,OR,DSP,ST,BLUE,STD,4/PK,20PK/CS: Brand: MEDLINE

## (undated) DEVICE — 3.0MM PRECISION NEURO (MATCH HEAD)

## (undated) DEVICE — CRANIOTOMY DRAPE, STERILE: Brand: MEDLINE

## (undated) DEVICE — CORD BPLR 2 PIN FLAT AND RND DISP

## (undated) DEVICE — 3M™ STERI-STRIP™ REINFORCED ADHESIVE SKIN CLOSURES, R1547, 1/2 IN X 4 IN (12 MM X 100 MM), 6 STRIPS/ENVELOPE: Brand: 3M™ STERI-STRIP™

## (undated) DEVICE — GOWN,AURORA,NONREINFORCED,LARGE: Brand: MEDLINE

## (undated) DEVICE — NEEDLE HYPO 22GA L1 1/2IN PIVOTING SHLD FOR LUERLOCK SYR

## (undated) DEVICE — SHEET,DRAPE,53X77,STERILE: Brand: MEDLINE

## (undated) DEVICE — 3M™ STERI-DRAPE™ U-DRAPE 1015: Brand: STERI-DRAPE™

## (undated) DEVICE — SYRINGE BLB 50CC IRRIG PLIABLE FNGR FLNG GRAD FLSK DISP

## (undated) DEVICE — DRAPE TOWEL: Brand: CONVERTORS

## (undated) DEVICE — ELECTRODE PT RET AD L9FT HI MOIST COND ADH HYDRGEL CORDED

## (undated) DEVICE — SYRINGE MED 10ML LUERLOCK TIP W/O SFTY DISP

## (undated) DEVICE — NEEDLE SPNL 22GAX1 1/2IN

## (undated) DEVICE — SUTURE VCRL + SZ 2-0 L18IN ABSRB VLT OS-4 L22MM 1/2 CIR REV VCP706T

## (undated) DEVICE — SYRINGE MED 30ML STD CLR PLAS LUERLOCK TIP N CTRL DISP

## (undated) DEVICE — WAX SURG 2.5GM HEMSTAT BNE BEESWAX PARAFFIN ISO PALMITATE

## (undated) DEVICE — CODMAN® SURGICAL PATTIES 1/2" X 1/2" (1.27CM X 1.27CM): Brand: CODMAN®

## (undated) DEVICE — GAUZE,SPONGE,4"X4",12PLY,STERILE,LF,2'S: Brand: MEDLINE

## (undated) DEVICE — X-RAY DETECTABLE SPONGES,16 PLY: Brand: VISTEC

## (undated) DEVICE — CATHETER IV 16GA 205ML/MIN L1.77IN OD1.74MM ID1.359MM GRY

## (undated) DEVICE — SUTURE VCRL + SZ 4-0 L18IN ABSRB UD L19MM PS-2 3/8 CIR PRIM VCP496H